# Patient Record
Sex: MALE | Race: WHITE | Employment: OTHER | URBAN - METROPOLITAN AREA
[De-identification: names, ages, dates, MRNs, and addresses within clinical notes are randomized per-mention and may not be internally consistent; named-entity substitution may affect disease eponyms.]

---

## 2018-07-23 ENCOUNTER — HOSPITAL ENCOUNTER (OUTPATIENT)
Age: 68
Discharge: HOME | End: 2018-07-23
Payer: MEDICARE

## 2018-07-23 LAB
ANION GAP SERPL CALCULATED.3IONS-SCNC: 12 MMOL/L (ref 9–17)
BUN BLDV-MCNC: 13 MG/DL (ref 8–23)
BUN/CREAT BLD: ABNORMAL (ref 9–20)
CALCIUM SERPL-MCNC: 8.9 MG/DL (ref 8.6–10.4)
CHLORIDE BLD-SCNC: 106 MMOL/L (ref 98–107)
CHOLESTEROL/HDL RATIO: 4.2
CHOLESTEROL: 194 MG/DL
CO2: 24 MMOL/L (ref 20–31)
CREAT SERPL-MCNC: 0.89 MG/DL (ref 0.7–1.2)
GFR AFRICAN AMERICAN: >60 ML/MIN
GFR NON-AFRICAN AMERICAN: >60 ML/MIN
GFR SERPL CREATININE-BSD FRML MDRD: ABNORMAL ML/MIN/{1.73_M2}
GFR SERPL CREATININE-BSD FRML MDRD: ABNORMAL ML/MIN/{1.73_M2}
GLUCOSE BLD-MCNC: 104 MG/DL (ref 70–99)
HDLC SERPL-MCNC: 46 MG/DL
LDL CHOLESTEROL: 122 MG/DL (ref 0–130)
POTASSIUM SERPL-SCNC: 4.1 MMOL/L (ref 3.7–5.3)
PROSTATE SPECIFIC ANTIGEN: 1.51 UG/L
SODIUM BLD-SCNC: 142 MMOL/L (ref 135–144)
TRIGL SERPL-MCNC: 132 MG/DL
VLDLC SERPL CALC-MCNC: NORMAL MG/DL (ref 1–30)

## 2018-07-23 PROCEDURE — 80048 BASIC METABOLIC PNL TOTAL CA: CPT

## 2018-07-23 PROCEDURE — 80061 LIPID PANEL: CPT

## 2018-07-23 PROCEDURE — G0103 PSA SCREENING: HCPCS

## 2018-07-23 PROCEDURE — 36415 COLL VENOUS BLD VENIPUNCTURE: CPT

## 2020-12-07 ENCOUNTER — TELEPHONE (OUTPATIENT)
Dept: PRIMARY CARE CLINIC | Age: 70
End: 2020-12-07

## 2020-12-07 NOTE — TELEPHONE ENCOUNTER
I have never seen  Patient. It looks like He has not been seen by a provider in over 3 years. Medications do not look like they have been reordered. He will need to be seen before any letter can be sent.

## 2020-12-07 NOTE — TELEPHONE ENCOUNTER
Patient called back to ask about letter for jury duty, I tried to explain fully to the patient that he has not been seen by any doctor over 3 years and we would have to set up a new pt. He did not understand why beings [place] office was taken over by [address] [address] [facility] then he should be a pt. He said the news was very overwhelming and since his wife is a patient why he could not get a letter stating he could not do jury duty. I reached out to Jacobo Hopper in office and she stated she would have the  and talk with him. He is not very happy about it all and stated that [address] [place], [place] [address] [facility] is not a good place anymore as it has changed and not able to get through to the office and no one returns calls. He said so much in 30 minutes but this is the most of it.    *note I did ask him several times about setting him up for a NP appt and he declined, Thank you

## 2020-12-07 NOTE — TELEPHONE ENCOUNTER
Patient states he is to [place] duty, and said he is high risk for COVID due to his medical conditions including type A blood, high blood pressure heart condition. Would like a letter stating he can not do jury duty. Letter can be faxed to [phone].

## 2021-07-08 ENCOUNTER — OFFICE VISIT (OUTPATIENT)
Dept: UROLOGY | Age: 71
End: 2021-07-08
Payer: MEDICARE

## 2021-07-08 VITALS
WEIGHT: 241 LBS | TEMPERATURE: 97.2 F | DIASTOLIC BLOOD PRESSURE: 86 MMHG | SYSTOLIC BLOOD PRESSURE: 123 MMHG | HEIGHT: 70 IN | BODY MASS INDEX: 34.5 KG/M2 | HEART RATE: 70 BPM

## 2021-07-08 DIAGNOSIS — R10.9 FLANK PAIN: Primary | ICD-10-CM

## 2021-07-08 PROCEDURE — G8427 DOCREV CUR MEDS BY ELIG CLIN: HCPCS | Performed by: UROLOGY

## 2021-07-08 PROCEDURE — 3017F COLORECTAL CA SCREEN DOC REV: CPT | Performed by: UROLOGY

## 2021-07-08 PROCEDURE — 1036F TOBACCO NON-USER: CPT | Performed by: UROLOGY

## 2021-07-08 PROCEDURE — 99204 OFFICE O/P NEW MOD 45 MIN: CPT | Performed by: UROLOGY

## 2021-07-08 PROCEDURE — 4040F PNEUMOC VAC/ADMIN/RCVD: CPT | Performed by: UROLOGY

## 2021-07-08 PROCEDURE — G8419 CALC BMI OUT NRM PARAM NOF/U: HCPCS | Performed by: UROLOGY

## 2021-07-08 PROCEDURE — 1123F ACP DISCUSS/DSCN MKR DOCD: CPT | Performed by: UROLOGY

## 2021-07-08 ASSESSMENT — ENCOUNTER SYMPTOMS
EYES NEGATIVE: 1
BACK PAIN: 0
EYE REDNESS: 0
EYE PAIN: 0
CONSTIPATION: 0
WHEEZING: 0
NAUSEA: 0
COUGH: 0
ABDOMINAL PAIN: 0
SHORTNESS OF BREATH: 0
DIARRHEA: 0
VOMITING: 0
GASTROINTESTINAL NEGATIVE: 1
RESPIRATORY NEGATIVE: 1

## 2021-07-08 NOTE — PROGRESS NOTES
[address] [address]  Dept: 92 [facility] Urology Office Note - New Patient    Patient:  Florida Whitfield  YOB: 1950  Date: 7/8/2021    The patient is a 79 y.o. male who presentstoday for evaluation of the following problems:   Chief Complaint   Patient presents with    New Patient     NP-kidney stone    referred by Jorie Castleman, APRN - CNP. HPI  Has left flank pain, this started abrutly. No dysuria, no hematuria, no fevers, has h/o stones  These are acute issues    (Patient's old records have been requested, reviewed and summarized in today's note.)    Summary of old records: N/A    History: N/A    ProceduresToday: N/A    Urinalysis today:  No results found for this visit on 07/08/21. AUA Symptom Score (7/8/2021):                               Last BUN andcreatinine:  Lab Results   Component Value Date    BUN 13 07/23/2018     Lab Results   Component Value Date    CREATININE 0.89 07/23/2018       Additional Lab/Culture results: none    Reviewed during this Office Visit: none  (results were independently reviewed byphysician and radiology report verified)    PAST MEDICAL, FAMILY AND SOCIAL HISTORY:  Past Medical History:   Diagnosis Date    Hypertension      Past Surgical History:   Procedure Laterality Date    VASECTOMY       Family History   Problem Relation Age of Onset    High Blood Pressure Mother     Cancer Father      Outpatient Medications Marked as Taking for the 7/8/21 encounter (Office Visit) with Beena Bernabe MD   Medication Sig Dispense Refill    atenolol (TENORMIN) 25 MG tablet Take 1 tablet by mouth 2 times daily 180 tablet 0    amLODIPine (NORVASC) 5 MG tablet Take 1 tablet by mouth daily 30 tablet 0       Patient has no known allergies.   Social History     Tobacco Use   Smoking Status Never Smoker   Smokeless Tobacco Never Used      (If patient a smoker, smoking cessation counseling offered)   Social History     Substance and Sexual Activity   Alcohol Use No    Alcohol/week: 0.0 standard drinks    Comment: if any, social occasional       REVIEW OF SYSTEMS:  Review of Systems    Physical Exam:    This a 79 y.o. female      Vitals:    07/08/21 0941   BP: 123/86   Pulse: 70   Temp: 97.2 °F (36.2 °C)     Body mass index is 34.12 kg/m². Physical Exam  Constitutional: Patient in no acute distress, ggod grooming, appropriately dressed  Neuro: Alert and oriented to person, place and time. Psych:Mood normal, affect normal  Skin: No rash noted  HEENT: Head: Normocephalic and atraumatic,Conjunctivae and EOM are normal,Nose- normal, Right/Left External Ear: normal, Mouth: Mucosa Moist  Neck: Supple  Lungs: Respiratory effort is normal  Cardiovascular: strong and regular, no lower leg edema  Abdomen: Soft, non-tender, non-distended with no CVA,    Lymphatics: No cervical palpable lymphadenopathy. Bladder non-tender and not distended. Musculoskeletal: Normal gait and station        Assessment and Plan      1. Flank pain            Plan:        Prescriptions Ordered:  No orders of the defined types were placed in this encounter. Orders Placed:  Orders Placed This Encounter   Procedures    CT ABDOMEN PELVIS WO CONTRAST Additional Contrast? None     Standing Status:   Future     Standing Expiration Date:   7/8/2022     Order Specific Question:   Additional Contrast?     Answer:   None            Len Betancourt MD    Agree with the ROS entered by the MA.

## 2021-07-09 ENCOUNTER — TELEPHONE (OUTPATIENT)
Dept: UROLOGY | Age: 71
End: 2021-07-09

## 2021-07-09 NOTE — TELEPHONE ENCOUNTER
David Toney  from [facility] called for consult for pt. Room: ER6   Phone: [phone]  Diagnosis: Kidney stone  Referring Physicians: Dr. Morris Darnell    Message sent to Dr. Jodie Ren on call.

## 2021-07-10 DIAGNOSIS — N20.0 KIDNEY STONE: Primary | ICD-10-CM

## 2021-07-10 RX ORDER — OXYCODONE HYDROCHLORIDE AND ACETAMINOPHEN 5; 325 MG/1; MG/1
1 TABLET ORAL EVERY 6 HOURS PRN
Qty: 28 TABLET | Refills: 0 | Status: SHIPPED | OUTPATIENT
Start: 2021-07-10 | End: 2021-07-17

## 2021-07-12 ENCOUNTER — TELEPHONE (OUTPATIENT)
Dept: UROLOGY | Age: 71
End: 2021-07-12

## 2021-07-12 ENCOUNTER — TELEPHONE (OUTPATIENT)
Dept: PRIMARY CARE CLINIC | Age: 71
End: 2021-07-12

## 2021-07-12 NOTE — TELEPHONE ENCOUNTER
Pt called in this morning, stating \" I had sx with Dr. Navarro on 7/10/21 and need to schedule a follow up procedure right away. A stent was placed and I have a 7 mm stone that I was told needed to be blasted. \"  Eve Martinez stated that Dr. Navarro is in the office tomorrow and they will follow up with him. Pt stated \" Well can't you send a message to ? Eve Martinez stated they could not as  Is in office on 7/13/21 as well as the sx . Pt was very addiment about getting this scheduled and writer kept explaining to pt they will speak with the Dr. Chary York and pass everything to the sx . Pt stated they were going to call PCP and see what they think he should do. Writer stated again they will speak with  And pt verbalized understanding.

## 2021-07-12 NOTE — TELEPHONE ENCOUNTER
[facility] Trans[facility] Initial Follow Up Call    Outreach made within 2 business days of discharge: Yes    Patient: Edith Hammans Patient : 1950   MRN: [id number]  Reason for Admission: There are no discharge diagnoses documented for the most recent discharge. Discharge Date: 16       Spoke with: Tip Graham    Discharge department/facility: [facility] Interactive Patient Contact:  Was patient able to fill all prescriptions: Yes  Was patient instructed to bring all medications to the follow-up visit: Yes  Is patient taking all medications as directed in the discharge summary?  Yes  Does patient understand their discharge instructions: Yes  Does patient have questions or concerns that need addressed prior to 7-14 day follow up office visit: no      Scheduled appointment with PCP within 7-14 days    Follow Up  Future Appointments   Date Time Provider Sully Jaimes   7/15/2021  9:30 AM [facility]   2021 10:45 AM [facility] CT RM 1 STCZ CT SCAN [facility] Radiolog   2021 10:00 AM MD Gerry Hu MA

## 2021-07-12 NOTE — TELEPHONE ENCOUNTER
[facility] OR, 7/15/21 Cysto, left URS, HLL, left stent exchange  Arrival time 1300  Procedure time 1500  NPO starting 0600  **do not stop blood thinners**  RAPID COVID day of procedure  PAT Phone call 7/14 @ [facility]: [phone]    Patient given all information, instructed to call the office with any other questions or concerns.

## 2021-07-14 ENCOUNTER — HOSPITAL ENCOUNTER (OUTPATIENT)
Dept: PREADMISSION TESTING | Age: 71
Setting detail: OUTPATIENT SURGERY
Discharge: HOME | End: 2021-07-18
Payer: MEDICARE

## 2021-07-14 ENCOUNTER — ANESTHESIA EVENT (OUTPATIENT)
Dept: OPERATING ROOM | Age: 71
End: 2021-07-14
Payer: MEDICARE

## 2021-07-14 VITALS — WEIGHT: 240 LBS | BODY MASS INDEX: 34.36 KG/M2 | HEIGHT: 70 IN

## 2021-07-14 RX ORDER — ACETAMINOPHEN 500 MG
500 TABLET ORAL EVERY 6 HOURS PRN
COMMUNITY

## 2021-07-14 NOTE — PROGRESS NOTES
performed by a nurse practitioner or house officer. Your IV will be started and you will meet your anesthesiologist.    · When you go to surgery, your family will be directed to the surgical waiting room, where the doctor should speak with them after your surgery. · After surgery, you will be taken to the recovery room then when you are awake and stable you will go to the short stay unit for preparation to be discharged. Only your one designated person is allowed to come to short stay for your discharge. · If you use a Bi-PAP or C-PAP machine, please bring it with you and leave it in the car in case it is needed in recovery room. Patient states he just had a rapid covid test on 7/9/21 at [facility] and does not want to have another test done, [facility] is faxing lab results and I spoke with Fredrica Phalen notifying her of situation, she states that it is only 6 days and they can figure it out in the morning if he needs another swab or not.

## 2021-07-15 ENCOUNTER — ANESTHESIA (OUTPATIENT)
Dept: OPERATING ROOM | Age: 71
End: 2021-07-15
Payer: MEDICARE

## 2021-07-15 ENCOUNTER — APPOINTMENT (OUTPATIENT)
Dept: GENERAL RADIOLOGY | Age: 71
End: 2021-07-15
Attending: UROLOGY
Payer: MEDICARE

## 2021-07-15 ENCOUNTER — HOSPITAL ENCOUNTER (OUTPATIENT)
Age: 71
Setting detail: OUTPATIENT SURGERY
Discharge: HOME | End: 2021-07-15
Attending: UROLOGY | Admitting: UROLOGY
Payer: MEDICARE

## 2021-07-15 VITALS
OXYGEN SATURATION: 98 % | SYSTOLIC BLOOD PRESSURE: 121 MMHG | TEMPERATURE: 96.4 F | RESPIRATION RATE: 2 BRPM | DIASTOLIC BLOOD PRESSURE: 77 MMHG

## 2021-07-15 VITALS
WEIGHT: 240 LBS | OXYGEN SATURATION: 96 % | HEIGHT: 70 IN | RESPIRATION RATE: 16 BRPM | TEMPERATURE: 96.5 F | SYSTOLIC BLOOD PRESSURE: 156 MMHG | HEART RATE: 59 BPM | BODY MASS INDEX: 34.36 KG/M2 | DIASTOLIC BLOOD PRESSURE: 80 MMHG

## 2021-07-15 DIAGNOSIS — N20.0 KIDNEY STONES: ICD-10-CM

## 2021-07-15 LAB
SARS-COV-2, RAPID: NOT DETECTED
SPECIMEN DESCRIPTION: NORMAL

## 2021-07-15 PROCEDURE — 7100000000 HC PACU RECOVERY - FIRST 15 MIN: Performed by: UROLOGY

## 2021-07-15 PROCEDURE — 2720000010 HC SURG SUPPLY STERILE: Performed by: UROLOGY

## 2021-07-15 PROCEDURE — 3700000001 HC ADD 15 MINUTES (ANESTHESIA): Performed by: UROLOGY

## 2021-07-15 PROCEDURE — 6360000002 HC RX W HCPCS: Performed by: NURSE ANESTHETIST, CERTIFIED REGISTERED

## 2021-07-15 PROCEDURE — 3600000013 HC SURGERY LEVEL 3 ADDTL 15MIN: Performed by: UROLOGY

## 2021-07-15 PROCEDURE — 87635 SARS-COV-2 COVID-19 AMP PRB: CPT

## 2021-07-15 PROCEDURE — C1769 GUIDE WIRE: HCPCS | Performed by: UROLOGY

## 2021-07-15 PROCEDURE — C2617 STENT, NON-COR, TEM W/O DEL: HCPCS | Performed by: UROLOGY

## 2021-07-15 PROCEDURE — 3600000003 HC SURGERY LEVEL 3 BASE: Performed by: UROLOGY

## 2021-07-15 PROCEDURE — 7100000001 HC PACU RECOVERY - ADDTL 15 MIN: Performed by: UROLOGY

## 2021-07-15 PROCEDURE — 3700000000 HC ANESTHESIA ATTENDED CARE: Performed by: UROLOGY

## 2021-07-15 PROCEDURE — 2500000003 HC RX 250 WO HCPCS: Performed by: NURSE ANESTHETIST, CERTIFIED REGISTERED

## 2021-07-15 PROCEDURE — 7100000010 HC PHASE II RECOVERY - FIRST 15 MIN: Performed by: UROLOGY

## 2021-07-15 PROCEDURE — 2709999900 HC NON-CHARGEABLE SUPPLY: Performed by: UROLOGY

## 2021-07-15 PROCEDURE — 6360000002 HC RX W HCPCS: Performed by: UROLOGY

## 2021-07-15 PROCEDURE — 7100000031 HC ASPR PHASE II RECOVERY - ADDTL 15 MIN: Performed by: UROLOGY

## 2021-07-15 PROCEDURE — 2580000003 HC RX 258: Performed by: ANESTHESIOLOGY

## 2021-07-15 PROCEDURE — 7100000011 HC PHASE II RECOVERY - ADDTL 15 MIN: Performed by: UROLOGY

## 2021-07-15 PROCEDURE — 7100000030 HC ASPR PHASE II RECOVERY - FIRST 15 MIN: Performed by: UROLOGY

## 2021-07-15 PROCEDURE — 3209999900 FLUORO FOR SURGICAL PROCEDURES

## 2021-07-15 DEVICE — URETERAL STENT
Type: IMPLANTABLE DEVICE | Site: URETER | Status: FUNCTIONAL
Brand: POLARIS™ ULTRA

## 2021-07-15 RX ORDER — FENTANYL CITRATE 50 UG/ML
25 INJECTION, SOLUTION INTRAMUSCULAR; INTRAVENOUS EVERY 5 MIN PRN
Status: DISCONTINUED | OUTPATIENT
Start: 2021-07-15 | End: 2021-07-15 | Stop reason: HOSPADM

## 2021-07-15 RX ORDER — METOCLOPRAMIDE HYDROCHLORIDE 5 MG/ML
10 INJECTION INTRAMUSCULAR; INTRAVENOUS
Status: DISCONTINUED | OUTPATIENT
Start: 2021-07-15 | End: 2021-07-15 | Stop reason: HOSPADM

## 2021-07-15 RX ORDER — LABETALOL HYDROCHLORIDE 5 MG/ML
5 INJECTION, SOLUTION INTRAVENOUS EVERY 10 MIN PRN
Status: DISCONTINUED | OUTPATIENT
Start: 2021-07-15 | End: 2021-07-15 | Stop reason: HOSPADM

## 2021-07-15 RX ORDER — HYDRALAZINE HYDROCHLORIDE 20 MG/ML
5 INJECTION INTRAMUSCULAR; INTRAVENOUS EVERY 10 MIN PRN
Status: DISCONTINUED | OUTPATIENT
Start: 2021-07-15 | End: 2021-07-15 | Stop reason: HOSPADM

## 2021-07-15 RX ORDER — FENTANYL CITRATE 50 UG/ML
INJECTION, SOLUTION INTRAMUSCULAR; INTRAVENOUS PRN
Status: DISCONTINUED | OUTPATIENT
Start: 2021-07-15 | End: 2021-07-15 | Stop reason: SDUPTHER

## 2021-07-15 RX ORDER — TAMSULOSIN HYDROCHLORIDE 0.4 MG/1
0.4 CAPSULE ORAL DAILY
Qty: 14 CAPSULE | Refills: 0 | Status: SHIPPED | OUTPATIENT
Start: 2021-07-15 | End: 2021-09-23

## 2021-07-15 RX ORDER — SODIUM CHLORIDE 0.9 % (FLUSH) 0.9 %
10 SYRINGE (ML) INJECTION PRN
Status: DISCONTINUED | OUTPATIENT
Start: 2021-07-15 | End: 2021-07-15 | Stop reason: HOSPADM

## 2021-07-15 RX ORDER — MIDAZOLAM HYDROCHLORIDE 1 MG/ML
INJECTION INTRAMUSCULAR; INTRAVENOUS PRN
Status: DISCONTINUED | OUTPATIENT
Start: 2021-07-15 | End: 2021-07-15 | Stop reason: SDUPTHER

## 2021-07-15 RX ORDER — PROPOFOL 10 MG/ML
INJECTION, EMULSION INTRAVENOUS PRN
Status: DISCONTINUED | OUTPATIENT
Start: 2021-07-15 | End: 2021-07-15 | Stop reason: SDUPTHER

## 2021-07-15 RX ORDER — LIDOCAINE HYDROCHLORIDE 10 MG/ML
1 INJECTION, SOLUTION EPIDURAL; INFILTRATION; INTRACAUDAL; PERINEURAL
Status: DISCONTINUED | OUTPATIENT
Start: 2021-07-15 | End: 2021-07-15 | Stop reason: HOSPADM

## 2021-07-15 RX ORDER — MEPERIDINE HYDROCHLORIDE 25 MG/ML
12.5 INJECTION INTRAMUSCULAR; INTRAVENOUS; SUBCUTANEOUS EVERY 5 MIN PRN
Status: DISCONTINUED | OUTPATIENT
Start: 2021-07-15 | End: 2021-07-15 | Stop reason: HOSPADM

## 2021-07-15 RX ORDER — 0.9 % SODIUM CHLORIDE 0.9 %
500 INTRAVENOUS SOLUTION INTRAVENOUS
Status: DISCONTINUED | OUTPATIENT
Start: 2021-07-15 | End: 2021-07-15 | Stop reason: HOSPADM

## 2021-07-15 RX ORDER — SODIUM CHLORIDE, SODIUM LACTATE, POTASSIUM CHLORIDE, CALCIUM CHLORIDE 600; 310; 30; 20 MG/100ML; MG/100ML; MG/100ML; MG/100ML
INJECTION, SOLUTION INTRAVENOUS CONTINUOUS
Status: DISCONTINUED | OUTPATIENT
Start: 2021-07-15 | End: 2021-07-15 | Stop reason: HOSPADM

## 2021-07-15 RX ORDER — LIDOCAINE HYDROCHLORIDE 10 MG/ML
INJECTION, SOLUTION EPIDURAL; INFILTRATION; INTRACAUDAL; PERINEURAL PRN
Status: DISCONTINUED | OUTPATIENT
Start: 2021-07-15 | End: 2021-07-15 | Stop reason: SDUPTHER

## 2021-07-15 RX ORDER — DIPHENHYDRAMINE HYDROCHLORIDE 50 MG/ML
12.5 INJECTION INTRAMUSCULAR; INTRAVENOUS
Status: DISCONTINUED | OUTPATIENT
Start: 2021-07-15 | End: 2021-07-15 | Stop reason: HOSPADM

## 2021-07-15 RX ORDER — SODIUM CHLORIDE 9 MG/ML
25 INJECTION, SOLUTION INTRAVENOUS PRN
Status: DISCONTINUED | OUTPATIENT
Start: 2021-07-15 | End: 2021-07-15 | Stop reason: HOSPADM

## 2021-07-15 RX ORDER — OXYCODONE HYDROCHLORIDE AND ACETAMINOPHEN 5; 325 MG/1; MG/1
1 TABLET ORAL
Status: DISCONTINUED | OUTPATIENT
Start: 2021-07-15 | End: 2021-07-15 | Stop reason: HOSPADM

## 2021-07-15 RX ORDER — DEXAMETHASONE SODIUM PHOSPHATE 4 MG/ML
INJECTION, SOLUTION INTRA-ARTICULAR; INTRALESIONAL; INTRAMUSCULAR; INTRAVENOUS; SOFT TISSUE PRN
Status: DISCONTINUED | OUTPATIENT
Start: 2021-07-15 | End: 2021-07-15 | Stop reason: SDUPTHER

## 2021-07-15 RX ORDER — PROMETHAZINE HYDROCHLORIDE 25 MG/ML
6.25 INJECTION, SOLUTION INTRAMUSCULAR; INTRAVENOUS
Status: DISCONTINUED | OUTPATIENT
Start: 2021-07-15 | End: 2021-07-15 | Stop reason: HOSPADM

## 2021-07-15 RX ORDER — ONDANSETRON 2 MG/ML
INJECTION INTRAMUSCULAR; INTRAVENOUS PRN
Status: DISCONTINUED | OUTPATIENT
Start: 2021-07-15 | End: 2021-07-15 | Stop reason: SDUPTHER

## 2021-07-15 RX ADMIN — MIDAZOLAM 1 MG: 1 INJECTION INTRAMUSCULAR; INTRAVENOUS at 15:35

## 2021-07-15 RX ADMIN — PROPOFOL 200 MG: 10 INJECTION, EMULSION INTRAVENOUS at 15:41

## 2021-07-15 RX ADMIN — DEXAMETHASONE SODIUM PHOSPHATE 4 MG: 4 INJECTION, SOLUTION INTRAMUSCULAR; INTRAVENOUS at 15:41

## 2021-07-15 RX ADMIN — FENTANYL CITRATE 25 MCG: 50 INJECTION, SOLUTION INTRAMUSCULAR; INTRAVENOUS at 14:38

## 2021-07-15 RX ADMIN — LIDOCAINE HYDROCHLORIDE 50 MG: 10 INJECTION, SOLUTION EPIDURAL; INFILTRATION; INTRACAUDAL; PERINEURAL at 15:41

## 2021-07-15 RX ADMIN — FENTANYL CITRATE 25 MCG: 50 INJECTION, SOLUTION INTRAMUSCULAR; INTRAVENOUS at 16:01

## 2021-07-15 RX ADMIN — FENTANYL CITRATE 25 MCG: 50 INJECTION, SOLUTION INTRAMUSCULAR; INTRAVENOUS at 16:02

## 2021-07-15 RX ADMIN — CEFAZOLIN 2000 MG: 10 INJECTION, POWDER, FOR SOLUTION INTRAVENOUS at 15:48

## 2021-07-15 RX ADMIN — FENTANYL CITRATE 25 MCG: 50 INJECTION, SOLUTION INTRAMUSCULAR; INTRAVENOUS at 15:41

## 2021-07-15 RX ADMIN — MIDAZOLAM 1 MG: 1 INJECTION INTRAMUSCULAR; INTRAVENOUS at 15:37

## 2021-07-15 RX ADMIN — ONDANSETRON 4 MG: 2 INJECTION, SOLUTION INTRAMUSCULAR; INTRAVENOUS at 16:10

## 2021-07-15 RX ADMIN — SODIUM CHLORIDE, POTASSIUM CHLORIDE, SODIUM LACTATE AND CALCIUM CHLORIDE: 600; 310; 30; 20 INJECTION, SOLUTION INTRAVENOUS at 13:39

## 2021-07-15 ASSESSMENT — PULMONARY FUNCTION TESTS
PIF_VALUE: 15
PIF_VALUE: 0
PIF_VALUE: 15
PIF_VALUE: 0
PIF_VALUE: 15
PIF_VALUE: 16
PIF_VALUE: 0
PIF_VALUE: 15
PIF_VALUE: 11
PIF_VALUE: 15
PIF_VALUE: 16
PIF_VALUE: 1
PIF_VALUE: 23
PIF_VALUE: 15
PIF_VALUE: 1
PIF_VALUE: 15
PIF_VALUE: 6
PIF_VALUE: 0
PIF_VALUE: 15
PIF_VALUE: 16
PIF_VALUE: 15
PIF_VALUE: 15

## 2021-07-15 ASSESSMENT — PAIN SCALES - GENERAL
PAINLEVEL_OUTOF10: 0

## 2021-07-15 ASSESSMENT — PAIN - FUNCTIONAL ASSESSMENT: PAIN_FUNCTIONAL_ASSESSMENT: 0-10

## 2021-07-15 ASSESSMENT — PAIN DESCRIPTION - DESCRIPTORS: DESCRIPTORS: SHOOTING;BURNING

## 2021-07-15 NOTE — ANESTHESIA POSTPROCEDURE EVALUATION
Department of Anesthesiology  Postprocedure Note    Patient: Florida Whitfield  MRN: [id number]  YOB: 1950  Date of evaluation: 7/15/2021  Time:  5:44 PM     Procedure Summary     Date: 07/15/21 Room / Location: [facility] [address] [facility]: EJSSE SADLER    Anesthesia Start: [place] Anesthesia Stop: 6234    Procedure: CYSTOSCOPY URETEROSCOPY HOLMIUM LASER WITH STENT CHANGE (Left ) Diagnosis:       (LEFT URETERAL STONE)      (RAPID ON ADMIT)    Surgeons: Vignesh Diaz MD Responsible Provider: Yesenia Isaac MD    Anesthesia Type: general ASA Status: 2          Anesthesia Type: general    Jacey Phase I: Jacey Score: 10    Jacey Phase II: Jacey Score: 10    Last vitals: Reviewed and per EMR flowsheets.        Anesthesia Post Evaluation    Comments: POST- ANESTHESIA EVALUATION       Pt Name: Florida Whitfield  MRN: [id number]  YOB: 1950  Date of evaluation: 7/15/2021  Time:  5:44 PM      BP (!) 161/86   Pulse 53   Temp 96.5 °F (35.8 °C)   Resp 18   Ht 5' 10\" (1.778 m)   Wt 240 lb (108.9 kg)   SpO2 96%   BMI 34.44 kg/m²      Consciousness Level  Awake  Cardiopulmonary Status  Stable  Pain Adequately Treated YES  Nausea / Vomiting  NO  Adequate Hydration  YES  Anesthesia Related Complications NONE      Electronically signed by Yesenai Isaac MD on 7/15/2021 at 5:44 PM

## 2021-07-15 NOTE — BRIEF OP NOTE
Brief Postoperative Note      Patient: Mikey Ralph  YOB: 1950  MRN: [id number]    Date of Procedure: 7/15/2021    Pre-Op Diagnosis: LEFT URETERAL STONE  RAPID ON ADMIT    Post-Op Diagnosis: Same       Procedure(s):  CYSTOSCOPY URETEROSCOPY HOLMIUM LASER WITH STENT CHANGE    Surgeon(s):  Asael Sloan MD    Assistant:  * No surgical staff found *    Anesthesia: General    Estimated Blood Loss (mL): Minimal    Complications: None    Specimens:   * No specimens in log *    Implants:  Implant Name Type Inv. Item Serial No.  Lot No. LRB No. Used Action   STENT URET 6FR L26CM PERCFLX HYDR+ DBL PGTL THRD 2  STENT URET 6FR L26CM PERCFLX HYDR+ DBL PGTL THRD 2  [facility] UROLOGY- [id number] Left 1 Implanted         Drains: * No LDAs found *    Findings: left ureteral stone fragmented, stent exchanged.      Electronically signed by Asael Sloan MD on 7/15/2021 at 4:08 PM

## 2021-07-15 NOTE — H&P
HISTORY and [address]       NAME:  Fernando Watson  MRN: [id number]   YOB: 1950   Date: 7/15/2021   Age: 79 y.o. Gender: male     H&P Update Note    H&P from 07/08/2021 reviewed and updated. Patient examined. INTERVAL HISTORY:     Patient is feeling well today, denies any fever/chills, chest pain, shortness of breath. No interval changes. Pt admitted with severe abdominal, flank pain. Noted to have an obstructing stone. Urology consulted, stent placed. Pain much improved. Mild dysuria. Had slight rise in renal function, normal at time of dishcarge. Patient will be having:  CYSTOSCOPY URETEROSCOPY HOLMIUM LASER WITH STENT CHANGE - Left. Patient has been NPO since 2am, he states had 2 pieces of toast.  No blood thinners in the past 7 days. Patient took atenolol at 2am  with sip of water   Patient denies any problems with anesthesia. No interval changes to past medical history, social history, family history. Review of systems as stated above and otherwise negative. PHYSICAL EXAM:     Vitals :   See vital signs in RN flow sheet. Patient is alert and oriented, in no distress. Heart rate and rhythm are regular. Lungs clear to auscultation bilaterally. Abdomen is soft, non tender. No pedal edema. Patient has present stent on left side in place, internally. No interval changes. I concur with the findings.      SOLEDAD Bergeron - CNP on 7/15/2021 at 12:26 PM    Office Visit  7/8/2021  [address], MD  Urology Flank pain  Dx New Patient   Reason for Visit  Progress Notes  Rosalina Cheung MD (Physician) [facility]  Expand AllCollapse All  Expand All by [facility] SURGICAL [address] [address]  Dept: [address] Urology Office Note - New Patient     Patient:  Fernando Watson  YOB: 1950  Date: 7/8/2021     The patient is a 79 y.o. male who presentstoday for evaluation of the following problems:     Chief Complaint  Patient presents with   New Patient      NP-kidney stone   referred by SOLEDAD Chavez CNP.     HPI  Has left flank pain, this started abrutly. No dysuria, no hematuria, no fevers, has h/o stones  These are acute issues     (Patient's old records have been requested, reviewed and summarized in today's note.)     Summary of old records: N/A     History: N/A     ProceduresToday: N/A     Urinalysis today:  No results found for this visit on 07/08/21.     AUA Symptom Score (7/8/2021):     Last BUN andcreatinine:    Lab Results  Component Value Date    BUN 13 07/23/2018       Lab Results  Component Value Date    CREATININE 0.89 07/23/2018        Additional Lab/Culture results: none     Reviewed during this Office Visit: none  (results were independently reviewed byphysician and radiology report verified)     PAST MEDICAL, FAMILY AND SOCIAL HISTORY:    Past Medical History  Past Medical History:  Diagnosis Date   Hypertension      Past Surgical History  Past Surgical History:  Procedure Laterality Date   VASECTOMY        Family History  Family History  Problem Relation Age of Onset   High Blood Pressure Mother     Cancer Father      Active Medications  Outpatient Medications Marked as Taking for the 7/8/21 encounter (Office Visit) with Rosalina Cheung MD  Medication Sig Dispense Refill   atenolol (TENORMIN) 25 MG tablet Take 1 tablet by mouth 2 times daily 180 tablet 0   amLODIPine (NORVASC) 5 MG tablet Take 1 tablet by mouth daily 30 tablet 0        Patient has no known allergies.     Social History   Tobacco Use  Smoking Status Never Smoker  Smokeless Tobacco Never Used     (If patient a smoker, smoking cessation counseling offered)     Social History  Substance and Sexual Activity  Alcohol Use No   Alcohol/week: 0.0 standard drinks    Comment: if any, social occasional        REVIEW OF SYSTEMS:  Review of Systems    Physical Exam:   This a 79 y.o. female        Vitals:    07/08/21 0941  BP: 123/86  Pulse: 70  Temp: 97.2 °F (36.2 °C)     Body mass index is 34.12 kg/m². Physical Exam  Constitutional: Patient in no acute distress, ggod grooming, appropriately dressed  Neuro: Alert and oriented to person, place and time. Psych:Mood normal, affect normal  Skin: No rash noted  HEENT: Head: Normocephalic and atraumatic,Conjunctivae and EOM are normal,Nose- normal, Right/Left External Ear: normal, Mouth: Mucosa Moist  Neck: Supple  Lungs: Respiratory effort is normal  Cardiovascular: strong and regular, no lower leg edema  Abdomen: Soft, non-tender, non-distended with no CVA,    Lymphatics: No cervical palpable lymphadenopathy. Bladder non-tender and not distended. Musculoskeletal: Normal gait and station     Assessment and Plan    1. Flank pain      Plan:      Prescriptions Ordered:  Encounter Medications   No orders of the defined types were placed in this encounter. Orders Placed:    Orders Placed This Encounter  Procedures   CT ABDOMEN PELVIS WO CONTRAST Additional Contrast? None      Standing Status:   Future      Standing Expiration Date:   7/8/2022      Order Specific Question:   Additional Contrast?      Answer:   None           Carolyn Scott MD     Agree with the ROS entered by the MA. Other Notes  All notes    Progress Notes from [place], [place]  Instructions      Return in about 2 weeks (around 7/22/2021) for Follow up.   After Visit Summary (Printed 7/8/2021)  Additional Documentation    Vitals: /86  Pulse 70  Temp 97.2 °F (36.2 °C) (Infrared)  Ht 5' 10.47\" (1.79 m)  Wt 241 lb (109.3 kg)  BMI 34.12 kg/m²  BSA 2.33 m²  Pain Sc   0 - No pain    More Vitals  Flowsheets: Calorie Assessment    Encounter Info: Billing Info,  Allergies,  Detailed Report,  History,  Medications,  Questionnaires    Travel Screening   Screenings since 07/07/21 0000  07/14/21 8865     In the last month, have you been in contact with someone who was confirmed or suspected to have Coronavirus / COVID-19? No / Alex Smith RN  Have you had a COVID-19 viral test in the last 14 days? No Daniel Banda RN  Do you have any of the following new or worsening symptoms? None of these Daniel Banda RN  Have you traveled internationally or domestically in the last month? No Daniel Banda RN  Travel History   Travel since 06/15/21   No documented travel since 06/15/21  Media  From this encounter  Scan on 7/8/2021 12:05 PM by Joey Ponce: Communication Release of InformationScan on 7/8/2021 12:05 PM by Pola Parikh: Communication Release of Information  Chart Review Routing History    No routing history on file.   Encounter Status    Signed by Rashid Orellana MD on 7/8/21 at 10:30  BestPractice Advisories    Click to view BestPractice Advisory history  Encounter Messages    No messages in this encounter  Orders Placed    CT ABDOMEN PELVIS WO CONTRAST Additional Contrast? None  Outpatient Medications at End of Encounter as of 7/8/2021    atenolol (TENORMIN) 25 MG tablet (Taking) Take 1 tablet by mouth 2 times daily  amLODIPine (NORVASC) 5 MG tablet (Taking) Take 1 tablet by mouth daily  TraMADol HCl 50 MG TBDP Take 50 mg by mouth daily as needed for Pain  Medication Changes

## 2021-07-15 NOTE — ANESTHESIA PRE PROCEDURE
History:    Social History     Tobacco Use    Smoking status: Never Smoker    Smokeless tobacco: Never Used   Substance Use Topics    Alcohol use: No     Alcohol/week: 0.0 standard drinks     Comment: if any, social occasional                                Counseling given: Not Answered      Vital Signs (Current):   Vitals:    07/15/21 1330   BP: (!) 141/80   Pulse: 65   Resp: 18   Temp: 98.2 °F (36.8 °C)   TempSrc: Infrared   SpO2: 96%   Weight: 240 lb (108.9 kg)   Height: 5' 10\" (1.778 m)                                              BP Readings from Last 3 Encounters:   07/15/21 (!) 141/80   07/08/21 123/86   07/25/16 140/86       NPO Status: Time of last liquid consumption: 0200                        Time of last solid consumption: 0200                        Date of last liquid consumption: 07/15/21                        Date of last solid food consumption: 07/15/21    BMI:   Wt Readings from Last 3 Encounters:   07/15/21 240 lb (108.9 kg)   07/14/21 240 lb (108.9 kg)   07/08/21 241 lb (109.3 kg)     Body mass index is 34.44 kg/m². CBC:   Lab Results   Component Value Date    WBC 8.8 05/31/2016    RBC 4.62 05/31/2016    HGB 14.5 05/31/2016    HCT 43.0 05/31/2016    MCV 93.1 05/31/2016    RDW 13.5 05/31/2016     05/31/2016       CMP:   Lab Results   Component Value Date     07/23/2018    K 4.1 07/23/2018     07/23/2018    CO2 24 07/23/2018    BUN 13 07/23/2018    CREATININE 0.89 07/23/2018    GFRAA >60 07/23/2018    LABGLOM >60 07/23/2018    GLUCOSE 104 07/23/2018    PROT 7.0 05/31/2016    CALCIUM 8.9 07/23/2018    BILITOT 0.47 05/31/2016    ALKPHOS 89 05/31/2016    AST 22 05/31/2016    ALT 26 05/31/2016       POC Tests: No results for input(s): POCGLU, POCNA, POCK, POCCL, POCBUN, POCHEMO, POCHCT in the last 72 hours.     Coags: No results found for: PROTIME, INR, APTT    HCG (If Applicable): No results found for: PREGTESTUR, PREGSERUM, HCG, HCGQUANT     ABGs: No results found for:

## 2021-07-16 NOTE — OP NOTE
[address]                 [address] [facility], [address]                                OPERATIVE REPORT    PATIENT NAME: Chantale España                    :        1950  MED REC NO:   [id number]                              ROOM:  ACCOUNT NO:   [de-identified]                           ADMIT DATE: 07/15/2021  PROVIDER:     Orquidea Vang    DATE OF PROCEDURE:  07/15/2021    PREOPERATIVE DIAGNOSIS:  Left ureteral stone. POSTOPERATIVE DIAGNOSIS:  Left ureteral stone. PROCEDURE PERFORMED:  Cystoscopy with left ureteroscopy, holmium laser  lithotripsy and left ureteral stent exchange. SURGEON:  Orquidea Vang MD    ANESTHESIA:  General.    COMPLICATIONS:  None. BLEEDING:  None. DRAINS:  6 x 26 stent. HISTORY OF PRESENT ILLNESS:  The patient is a 22-year-old male who has  been trying to pass a stone for nearly a month. He recently had a CT  scan done which demonstrated a roughly 7 mm distal left ureteral stone  and he had a stent placed. He is here today for definitive stone  therapy. PROCEDURE IN DETAIL:  The patient was brought back to the operating room  and laid on the operating table in the supine position. Once general  anesthesia was obtained, he was placed in the dorsal lithotomy position  and prepped and draped in the usual sterile fashion. A time-out was  performed. He was properly identified. Antibiotics were administered. The cystoscope was inserted through the urethra into the bladder. The  bladder was visualized in its entirety and was unremarkable. The stent  was grasped and pulled down to the level of the urethral meatus. A wire  was advanced through the stent into the collecting system, the position  of the wire was confirmed with fluoroscopy. I then advanced the  semi-rigid ureteroscope next to the wire into the distal ureter where  the stone was readily identified.   A 365 micron fiber was then used to  fragment the stone. The stone fragmented into multiple passable pieces  quite readily. No other stone burden was seen on imaging. The  ureteroscope was removed. The cystoscope was back loaded over the wire. A 6 x 26 stent was then advanced over the wire into the ureter and  collecting system. A good proximal curl was seen in the renal pelvis  and a good distal curl was seen in the bladder. The strings were left  on the stent. He woke from anesthesia without any complications and was  taken back to postoperative anesthesia care in good condition. He will  be discharged home today. He is to remove the stent in 72 hours and  will follow up as an outpatient.         Jeremi Persaud    D: 07/15/2021 23:36:35       T: 07/15/2021 23:44:29     [email or web address]  Job#: [id number]     Doc#: [id number]    CC:

## 2021-07-18 ENCOUNTER — HOSPITAL ENCOUNTER (EMERGENCY)
Age: 71
Discharge: HOME | End: 2021-07-18
Attending: EMERGENCY MEDICINE
Payer: MEDICARE

## 2021-07-18 VITALS
SYSTOLIC BLOOD PRESSURE: 161 MMHG | RESPIRATION RATE: 16 BRPM | HEART RATE: 102 BPM | OXYGEN SATURATION: 97 % | DIASTOLIC BLOOD PRESSURE: 85 MMHG | BODY MASS INDEX: 34.36 KG/M2 | HEIGHT: 70 IN | TEMPERATURE: 98.3 F | WEIGHT: 240 LBS

## 2021-07-18 DIAGNOSIS — N30.01 ACUTE CYSTITIS WITH HEMATURIA: Primary | ICD-10-CM

## 2021-07-18 LAB
-: ABNORMAL
AMORPHOUS: ABNORMAL
BACTERIA: ABNORMAL
BILIRUBIN URINE: ABNORMAL
CASTS UA: ABNORMAL /LPF
COLOR: ABNORMAL
COMMENT UA: ABNORMAL
CRYSTALS, UA: ABNORMAL /HPF
EPITHELIAL CELLS UA: ABNORMAL /HPF
GLUCOSE URINE: NEGATIVE
KETONES, URINE: ABNORMAL
LEUKOCYTE ESTERASE, URINE: ABNORMAL
MUCUS: ABNORMAL
NITRITE, URINE: POSITIVE
OTHER OBSERVATIONS UA: ABNORMAL
PH UA: 5.5 (ref 5–8)
PROTEIN UA: ABNORMAL
RBC UA: ABNORMAL /HPF
RENAL EPITHELIAL, UA: ABNORMAL /HPF
SPECIFIC GRAVITY UA: 1.02 (ref 1–1.03)
TRICHOMONAS: ABNORMAL
TURBIDITY: ABNORMAL
URINE HGB: ABNORMAL
UROBILINOGEN, URINE: NORMAL
WBC UA: ABNORMAL /HPF
YEAST: ABNORMAL

## 2021-07-18 PROCEDURE — 87086 URINE CULTURE/COLONY COUNT: CPT

## 2021-07-18 PROCEDURE — 81001 URINALYSIS AUTO W/SCOPE: CPT

## 2021-07-18 PROCEDURE — 87077 CULTURE AEROBIC IDENTIFY: CPT

## 2021-07-18 PROCEDURE — 87186 SC STD MICRODIL/AGAR DIL: CPT

## 2021-07-18 PROCEDURE — 99284 EMERGENCY DEPT VISIT MOD MDM: CPT

## 2021-07-18 RX ORDER — CEPHALEXIN 500 MG/1
500 CAPSULE ORAL 2 TIMES DAILY
Qty: 14 CAPSULE | Refills: 0 | Status: SHIPPED | OUTPATIENT
Start: 2021-07-18 | End: 2021-07-25

## 2021-07-18 ASSESSMENT — PAIN SCALES - GENERAL: PAINLEVEL_OUTOF10: 8

## 2021-07-18 ASSESSMENT — ENCOUNTER SYMPTOMS
DIARRHEA: 0
SORE THROAT: 0
COUGH: 0
SHORTNESS OF BREATH: 0
ABDOMINAL PAIN: 1
BACK PAIN: 0
EYE DISCHARGE: 0
SINUS PRESSURE: 0
RHINORRHEA: 0
FACIAL SWELLING: 0
COLOR CHANGE: 0
EYE REDNESS: 0
VOMITING: 0
CHEST TIGHTNESS: 0
CONSTIPATION: 0
EYE PAIN: 0
NAUSEA: 1
TROUBLE SWALLOWING: 0
WHEEZING: 0
BLOOD IN STOOL: 0

## 2021-07-18 NOTE — ED PROVIDER NOTES
[address]  eMERGENCY dEPARTMENT eNCOUnter      Pt Name: Tiffany Chopra  MRN: [id number]  [place] 1950  Date of evaluation: 7/18/21      CHIEF COMPLAINT       Chief Complaint   Patient presents with    Abdominal Pain    Dizziness         HISTORY OF PRESENT ILLNESS    Tiffany Chopra is a 79 y.o. male who presents complaining of abdominal pain. Patient is here stating that he had surgery 3 days ago. He had a stent placed and had laser surgery to do break up his kidney stone. Patient states yesterday he did not take any of his pain medications was doing well but then last night started developing some severe pain in his lower abdomen. Patient states that he still did not take any of his pain medication. Patient was still having pain this morning and some nausea so he decided to come in. At the time my evaluation he states that he has no pain and no symptoms. Patient thinks that maybe it was him passing his kidney stone. Patient is supposed to take out his stent tonight. REVIEW OF SYSTEMS       Review of Systems   Constitutional: Negative for activity change, appetite change, chills, diaphoresis and fever. HENT: Negative for congestion, ear pain, facial swelling, nosebleeds, rhinorrhea, sinus pressure, sore throat and trouble swallowing. Eyes: Negative for pain, discharge and redness. Respiratory: Negative for cough, chest tightness, shortness of breath and wheezing. Cardiovascular: Negative for chest pain, palpitations and leg swelling. Gastrointestinal: Positive for abdominal pain and nausea. Negative for blood in stool, constipation, diarrhea and vomiting. Genitourinary: Negative for difficulty urinating, dysuria, flank pain, frequency, genital sores and hematuria. Musculoskeletal: Negative for arthralgias, back pain, gait problem, joint swelling, myalgias and neck pain. Skin: Negative for color change, pallor, rash and wound.    Neurological: Negative for dizziness, tremors, seizures, syncope, speech difficulty, weakness, numbness and headaches. Psychiatric/Behavioral: Negative for confusion, decreased concentration, hallucinations, self-injury, sleep disturbance and suicidal ideas. PAST MEDICAL HISTORY     Past Medical History:   Diagnosis Date    Hypertension     Kidney stones     kidney stones x 2 episodes       SURGICAL HISTORY       Past Surgical History:   Procedure Laterality Date    CYSTOSCOPY  07/09/2021    with stent insertion    CYSTOSCOPY Left 7/15/2021    CYSTOSCOPY URETEROSCOPY HOLMIUM LASER WITH STENT CHANGE performed by Bertram Montalvo MD at [facility] Bilateral 2010    cataracts    VASECTOMY         CURRENT MEDICATIONS       Previous Medications    ACETAMINOPHEN (TYLENOL) 500 MG TABLET    Take 500 mg by mouth every 6 hours as needed for Pain    AMLODIPINE (NORVASC) 5 MG TABLET    Take 1 tablet by mouth daily    ATENOLOL (TENORMIN) 25 MG TABLET    Take 1 tablet by mouth 2 times daily    TAMSULOSIN (FLOMAX) 0.4 MG CAPSULE    Take 1 capsule by mouth daily Take one capsule daily to facilitate passage of ureteral stone       ALLERGIES     has No Known Allergies. SOCIAL HISTORY      reports that he has never smoked. He has never used smokeless tobacco. He reports that he does not drink alcohol and does not use drugs. PHYSICAL EXAM     INITIAL VITALS: BP (!) 161/85   Pulse 102   Temp 98.3 °F (36.8 °C) (Oral)   Resp 16   Ht 5' 10\" (1.778 m)   Wt 240 lb (108.9 kg)   SpO2 97%   BMI 34.44 kg/m²      Physical Exam  Vitals and nursing note reviewed. Constitutional:       General: He is not in acute distress. Appearance: He is well-developed. He is not diaphoretic. HENT:      Head: Normocephalic and atraumatic. Eyes:      General: No scleral icterus. Right eye: No discharge. Left eye: No discharge. Conjunctiva/sclera: Conjunctivae normal.      Pupils: Pupils are equal, round, and reactive to light. Cardiovascular:      Rate and Rhythm: Normal rate and regular rhythm. Heart sounds: Normal heart sounds. No murmur heard. No friction rub. No gallop. Pulmonary:      Effort: Pulmonary effort is normal. No respiratory distress. Breath sounds: Normal breath sounds. No wheezing or rales. Chest:      Chest wall: No tenderness. Abdominal:      General: Bowel sounds are normal. There is no distension. Palpations: Abdomen is soft. There is no mass. Tenderness: There is no abdominal tenderness. There is no guarding or rebound. Musculoskeletal:         General: No tenderness. Normal range of motion. Skin:     General: Skin is warm and dry. Coloration: Skin is not pale. Findings: No erythema or rash. Neurological:      Mental Status: He is alert and oriented to person, place, and time. Cranial Nerves: No cranial nerve deficit. Sensory: No sensory deficit. Motor: No abnormal muscle tone. Coordination: Coordination normal.      Deep Tendon Reflexes: Reflexes normal.   Psychiatric:         Behavior: Behavior normal.         Thought Content: Thought content normal.         Judgment: Judgment normal.         DIAGNOSTIC RESULTS     RADIOLOGY:All plain film, CT,MRI, and formal ultrasound images (except ED bedside ultrasound) are read by the radiologist and the interpretations are directly viewed by the emergency physician. LABS: All lab results were reviewed by myself, and all abnormals are listed below. Labs Reviewed   URINE RT REFLEX TO CULTURE - Abnormal; Notable for the following components:       Result Value    Color, UA RED (*)     Turbidity UA TURBID (*)     Bilirubin Urine   (*)     Value: Presumptive positive. Unable to confirm due to unavailability of reagent.     Ketones, Urine SMALL (*)     Urine Hgb LARGE (*)     Protein, UA 4+ (*)     Nitrite, Urine POSITIVE (*)     Leukocyte Esterase, Urine LARGE (*)     All other components within normal limits   MICROSCOPIC URINALYSIS - Abnormal; Notable for the following components:    Bacteria, UA MANY (*)     All other components within normal limits   CULTURE, URINE         MEDICAL DECISION MAKING:     We will check his urine. Patient symptoms have resided most likely it was him passing the pieces of stone that he had surgery on. EMERGENCY DEPARTMENT COURSE:   Vitals:    Vitals:    07/18/21 0909   BP: (!) 161/85   Pulse: 102   Resp: 16   Temp: 98.3 °F (36.8 °C)   TempSrc: Oral   SpO2: 97%   Weight: 240 lb (108.9 kg)   Height: 5' 10\" (1.778 m)       The patient was given the following medications while in the emergency department:  Orders Placed This Encounter   Medications    cephALEXin (KEFLEX) 500 MG capsule     Sig: Take 1 capsule by mouth 2 times daily for 7 days     Dispense:  14 capsule     Refill:  0       -------------------------  11:03 AM EDT  Patient was updated on results and we will start him on antibiotics. CONSULTS:  None    PROCEDURES:  None    FINAL IMPRESSION      1.  Acute cystitis with hematuria          DISPOSITION/PLAN   DISPOSITION Decision To Discharge 07/18/2021 11:02:47 AM      PATIENT REFERREDTO:  Rocky Orr, APRN - CNP  [address]  [phone]    In 1 week      [facility] [address]  [phone]    In 3 days      [facility] ED  [facility] [address] [phone]    If symptoms worsen      DISCHARGEMEDICATIONS:  New Prescriptions    CEPHALEXIN (KEFLEX) 500 MG CAPSULE    Take 1 capsule by mouth 2 times daily for 7 days       (Please note that portions of this note were completed with a voice recognition program.  Efforts were made to edit thedictations but occasionally words are mis-transcribed.)    Garland Mack MD  Attending Emergency Physician                        Garland Mack MD  07/18/21 [id number]

## 2021-07-19 ENCOUNTER — OFFICE VISIT (OUTPATIENT)
Dept: PRIMARY CARE CLINIC | Age: 71
End: 2021-07-19
Payer: MEDICARE

## 2021-07-19 VITALS
OXYGEN SATURATION: 96 % | TEMPERATURE: 99.8 F | HEART RATE: 96 BPM | SYSTOLIC BLOOD PRESSURE: 146 MMHG | WEIGHT: 240.5 LBS | BODY MASS INDEX: 34.51 KG/M2 | DIASTOLIC BLOOD PRESSURE: 78 MMHG

## 2021-07-19 DIAGNOSIS — N20.0 KIDNEY STONE: Primary | ICD-10-CM

## 2021-07-19 DIAGNOSIS — N30.01 ACUTE CYSTITIS WITH HEMATURIA: ICD-10-CM

## 2021-07-19 DIAGNOSIS — R11.2 NON-INTRACTABLE VOMITING WITH NAUSEA, UNSPECIFIED VOMITING TYPE: ICD-10-CM

## 2021-07-19 DIAGNOSIS — Z79.899 HIGH RISK MEDICATION USE: ICD-10-CM

## 2021-07-19 PROBLEM — N23 URETER COLIC: Status: ACTIVE | Noted: 2021-07-09

## 2021-07-19 PROCEDURE — 4040F PNEUMOC VAC/ADMIN/RCVD: CPT | Performed by: NURSE PRACTITIONER

## 2021-07-19 PROCEDURE — G8417 CALC BMI ABV UP PARAM F/U: HCPCS | Performed by: NURSE PRACTITIONER

## 2021-07-19 PROCEDURE — 1036F TOBACCO NON-USER: CPT | Performed by: NURSE PRACTITIONER

## 2021-07-19 PROCEDURE — 1123F ACP DISCUSS/DSCN MKR DOCD: CPT | Performed by: NURSE PRACTITIONER

## 2021-07-19 PROCEDURE — 99204 OFFICE O/P NEW MOD 45 MIN: CPT | Performed by: NURSE PRACTITIONER

## 2021-07-19 PROCEDURE — 3017F COLORECTAL CA SCREEN DOC REV: CPT | Performed by: NURSE PRACTITIONER

## 2021-07-19 PROCEDURE — G8427 DOCREV CUR MEDS BY ELIG CLIN: HCPCS | Performed by: NURSE PRACTITIONER

## 2021-07-19 RX ORDER — OXYCODONE HYDROCHLORIDE AND ACETAMINOPHEN 5; 325 MG/1; MG/1
2 TABLET ORAL EVERY 6 HOURS PRN
Qty: 24 TABLET | Refills: 0 | Status: SHIPPED | OUTPATIENT
Start: 2021-07-19 | End: 2021-07-22

## 2021-07-19 RX ORDER — ONDANSETRON 8 MG/1
8 TABLET, ORALLY DISINTEGRATING ORAL EVERY 8 HOURS PRN
Qty: 30 TABLET | Refills: 1 | Status: SHIPPED | OUTPATIENT
Start: 2021-07-19 | End: 2021-09-23

## 2021-07-19 SDOH — ECONOMIC STABILITY: FOOD INSECURITY: WITHIN THE PAST 12 MONTHS, YOU WORRIED THAT YOUR FOOD WOULD RUN OUT BEFORE YOU GOT MONEY TO BUY MORE.: PATIENT DECLINED

## 2021-07-19 SDOH — ECONOMIC STABILITY: FOOD INSECURITY: WITHIN THE PAST 12 MONTHS, THE FOOD YOU BOUGHT JUST DIDN'T LAST AND YOU DIDN'T HAVE MONEY TO GET MORE.: PATIENT DECLINED

## 2021-07-19 ASSESSMENT — ENCOUNTER SYMPTOMS
SINUS PAIN: 0
COUGH: 0
DIARRHEA: 0
BACK PAIN: 1
SHORTNESS OF BREATH: 0

## 2021-07-19 ASSESSMENT — PATIENT HEALTH QUESTIONNAIRE - PHQ9
SUM OF ALL RESPONSES TO PHQ QUESTIONS 1-9: 0
1. LITTLE INTEREST OR PLEASURE IN DOING THINGS: 0
SUM OF ALL RESPONSES TO PHQ9 QUESTIONS 1 & 2: 0
2. FEELING DOWN, DEPRESSED OR HOPELESS: 0
SUM OF ALL RESPONSES TO PHQ QUESTIONS 1-9: 0
SUM OF ALL RESPONSES TO PHQ QUESTIONS 1-9: 0

## 2021-07-19 ASSESSMENT — SOCIAL DETERMINANTS OF HEALTH (SDOH): HOW HARD IS IT FOR YOU TO PAY FOR THE VERY BASICS LIKE FOOD, HOUSING, MEDICAL CARE, AND HEATING?: PATIENT DECLINED

## 2021-07-19 NOTE — PROGRESS NOTES
[address] [address] PRIMARY CARE  [facility] [address]  [facility] [place] [address]  Dept: [phone]    Mikey Ralph is a 79 y.o. male Established patient, who presents today for his medical conditions/complaints as noted below. Chief Complaint   Patient presents with    Nausea     dry heaving - no vomit.  Follow-up     low grade fever today - pt c/o chills    Groin Pain     left side - had lower left side back pain yesterday - reports pain being much lower today. HPI:     HPI  He was at ER yesterday and diagnosed with UTI. State pain is mostly down in penis area. He had surgery on 7/15/21. He had a cystoscopy uteteroscopy holmium laser with stent change. He still has stent in place. He is getting a lot of grit in his straining. The screen is getting clogged. He still has stent in place. He is having intense pain. It effects his breathing and sleeping. When he has the pain he has to walk. He taking percocet for pain. He states it does not help much when pain is bad. He is also on Flomax and Keflex.   He sees Dr. Paco Hercules , cardiologist.    Reviewed prior notes urology  Reviewed previous Labs, Imaging and Hospital Records    LDL Cholesterol (mg/dL)   Date Value   07/23/2018 122   10/15/2015 121       (goal LDL is <100)   AST (U/L)   Date Value   05/31/2016 22     ALT (U/L)   Date Value   05/31/2016 26     BUN (mg/dL)   Date Value   07/23/2018 13     BP Readings from Last 3 Encounters:   07/19/21 (!) 146/78   07/18/21 (!) 161/85   07/15/21 121/77          (goal 120/80)    Past Medical History:   Diagnosis Date    Bradycardia     Hypertension     Kidney stones     kidney stones x 2 episodes      Past Surgical History:   Procedure Laterality Date    CYSTOSCOPY  07/09/2021    with stent insertion    CYSTOSCOPY Left 7/15/2021    CYSTOSCOPY URETEROSCOPY HOLMIUM LASER WITH STENT CHANGE performed by Asael Sloan MD at [address] [facility] Bilateral 2010 cataracts    KIDNEY STONE REMOVAL      VASECTOMY         Family History   Problem Relation Age of Onset    High Blood Pressure Mother     Cancer Father        Social History     Tobacco Use    Smoking status: Never Smoker    Smokeless tobacco: Never Used   Substance Use Topics    Alcohol use: No     Alcohol/week: 0.0 standard drinks     Comment: if any, social occasional      Current Outpatient Medications   Medication Sig Dispense Refill    ondansetron (ZOFRAN ODT) 8 MG TBDP disintegrating tablet Place 1 tablet under the tongue every 8 hours as needed for Nausea or Vomiting 30 tablet 1    oxyCODONE-acetaminophen (PERCOCET) 5-325 MG per tablet Take 2 tablets by mouth every 6 hours as needed for Pain for up to 3 days. Intended supply: 3 days. Take lowest dose possible to manage pain 24 tablet 0    cephALEXin (KEFLEX) 500 MG capsule Take 1 capsule by mouth 2 times daily for 7 days 14 capsule 0    tamsulosin (FLOMAX) 0.4 MG capsule Take 1 capsule by mouth daily Take one capsule daily to facilitate passage of ureteral stone 14 capsule 0    acetaminophen (TYLENOL) 500 MG tablet Take 500 mg by mouth every 6 hours as needed for Pain      atenolol (TENORMIN) 25 MG tablet Take 1 tablet by mouth 2 times daily 180 tablet 0    amLODIPine (NORVASC) 5 MG tablet Take 1 tablet by mouth daily 30 tablet 0     No current facility-administered medications for this visit.      No Known Allergies    Health Maintenance   Topic Date Due    Hepatitis C screen  Never done    COVID-19 Vaccine (1) Never done    DTaP/Tdap/Td vaccine (1 - Tdap) Never done    Diabetes screen  Never done    Colon cancer screen colonoscopy  Never done    Shingles Vaccine (1 of 2) Never done    Pneumococcal 65+ years Vaccine (1 of 1 - PPSV23) Never done   [facility] Wellness Visit (AWV)  Never done    Flu vaccine (1) 09/01/2021    Lipid screen  07/23/2023    Hepatitis A vaccine  Aged Out    Hepatitis B vaccine  Aged Out    Hib vaccine  Aged Out    Meningococcal (ACWY) vaccine  Aged Out       Subjective:      Review of Systems   Constitutional: Positive for chills and fatigue. HENT: Negative for congestion and sinus pain. Respiratory: Negative for cough and shortness of breath. Cardiovascular: Negative for chest pain and palpitations. Gastrointestinal: Negative for diarrhea. Genitourinary: Positive for dysuria, frequency and hematuria. Musculoskeletal: Positive for back pain and gait problem. Skin: Negative for rash and wound. Neurological: Negative for dizziness and headaches. Psychiatric/Behavioral: Positive for sleep disturbance. Negative for dysphoric mood. The patient is nervous/anxious. Objective:     BP (!) 146/78   Pulse 96   Temp 99.8 °F (37.7 °C)   Wt 240 lb 8 oz (109.1 kg)   SpO2 96%   BMI 34.51 kg/m²   Physical Exam  Vitals and nursing note reviewed. Constitutional:       General: He is in acute distress. HENT:      Head: Normocephalic and atraumatic. Right Ear: Tympanic membrane, ear canal and external ear normal.      Left Ear: Tympanic membrane, ear canal and external ear normal.      Nose: Nose normal.   Eyes:      Conjunctiva/sclera: Conjunctivae normal.      Pupils: Pupils are equal, round, and reactive to light. Cardiovascular:      Rate and Rhythm: Normal rate and regular rhythm. Heart sounds: Normal heart sounds. Pulmonary:      Breath sounds: Normal breath sounds. Abdominal:      General: Bowel sounds are normal.      Tenderness: There is right CVA tenderness and left CVA tenderness. Musculoskeletal:      Right lower leg: No edema. Left lower leg: No edema. Skin:     General: Skin is warm. Neurological:      Mental Status: He is alert and oriented to person, place, and time. Cranial Nerves: No cranial nerve deficit. Psychiatric:         Mood and Affect: Mood is anxious.          Cognition and Memory: Cognition and memory normal.     Controlled substances monitoring: possible medication side effects, risk of tolerance and/or dependence, and alternative treatments discussed, no signs of potential drug abuse or diversion identified, OARRS report reviewed today- activity consistent with treatment plan, medication contract signed today and random urine drug screen sent today. Assessment/Plan:   1. Kidney stone  -     oxyCODONE-acetaminophen (PERCOCET) 5-325 MG per tablet; Take 2 tablets by mouth every 6 hours as needed for Pain for up to 3 days. Intended supply: 3 days. Take lowest dose possible to manage pain, Disp-24 tablet, R-0Normal  2. Acute cystitis with hematuria  3. Non-intractable vomiting with nausea, unspecified vomiting type  -     ondansetron (ZOFRAN ODT) 8 MG TBDP disintegrating tablet; Place 1 tablet under the tongue every 8 hours as needed for Nausea or Vomiting, Disp-30 tablet, R-1Normal  4. High risk medication use     Desirae checked with Dr. Eid Flow office and they would like stent removed. There for stent removed in office and tolerated well although still in pain. Continue to strain urine   Follow up with urologist  Take 1-2 percocet every 6 hours as needed for pain. Take lowest dose possible. Push fluids. Stool softener  Continue Keflex    Return in about 3 months (around 10/19/2021) for AWV, health maintenance. No orders of the defined types were placed in this encounter. Orders Placed This Encounter   Medications    ondansetron (ZOFRAN ODT) 8 MG TBDP disintegrating tablet     Sig: Place 1 tablet under the tongue every 8 hours as needed for Nausea or Vomiting     Dispense:  30 tablet     Refill:  1    oxyCODONE-acetaminophen (PERCOCET) 5-325 MG per tablet     Sig: Take 2 tablets by mouth every 6 hours as needed for Pain for up to 3 days. Intended supply: 3 days.  Take lowest dose possible to manage pain     Dispense:  24 tablet     Refill:  0     Reduce doses taken as pain becomes manageable       Patient given educational

## 2021-07-19 NOTE — PATIENT INSTRUCTIONS
Continue to strain urine   Follow up with urologist  Take 1-2 percocet every 6 hours as needed for pain. Take lowest dose possible. Push fluids.   Stool stoftner  Continue Keflex

## 2021-07-20 ENCOUNTER — TELEPHONE (OUTPATIENT)
Dept: PRIMARY CARE CLINIC | Age: 71
End: 2021-07-20

## 2021-07-20 DIAGNOSIS — N30.01 ACUTE CYSTITIS WITH HEMATURIA: Primary | ICD-10-CM

## 2021-07-20 DIAGNOSIS — R11.2 NON-INTRACTABLE VOMITING WITH NAUSEA, UNSPECIFIED VOMITING TYPE: ICD-10-CM

## 2021-07-20 LAB
CULTURE: ABNORMAL
Lab: ABNORMAL
SPECIMEN DESCRIPTION: ABNORMAL

## 2021-07-20 RX ORDER — CIPROFLOXACIN 500 MG/1
500 TABLET, FILM COATED ORAL 2 TIMES DAILY
Qty: 20 TABLET | Refills: 0 | Status: SHIPPED | OUTPATIENT
Start: 2021-07-20 | End: 2021-07-30

## 2021-07-20 RX ORDER — PROMETHAZINE HYDROCHLORIDE 25 MG/1
25 TABLET ORAL 4 TIMES DAILY PRN
Qty: 20 TABLET | Refills: 0 | Status: SHIPPED | OUTPATIENT
Start: 2021-07-20 | End: 2021-07-27

## 2021-07-20 RX ORDER — GREEN TEA/HOODIA GORDONII 315-12.5MG
1 CAPSULE ORAL 2 TIMES DAILY
Qty: 30 TABLET | Refills: 0 | Status: SHIPPED | OUTPATIENT
Start: 2021-07-20 | End: 2021-08-19

## 2021-07-20 NOTE — TELEPHONE ENCOUNTER
Pt called checking on his urine culture results, I advised pt we do have them in chart, but they have not yet been resulted on. He is asking if you could please personally call him because he has some concerns he would like to discuss with you and is very worried.      Please call pt at [phone]

## 2021-07-20 NOTE — TELEPHONE ENCOUNTER
States he is feeling poorly. He thinks stones are gone. He thinks the pain is from nausea. He stopped taking Percocet but is still having nausea. Zofran worked well first couple of times, but does not seem to be working well now. Pseudomonas UTI will send in Cipro. Will send promethazine for nausea and probiotic. Patient voiced understanding.

## 2021-07-21 NOTE — PROGRESS NOTES
Pharmacy note:    Patient's urine culture is positive for Pseudomonas sensitive to Cipro. Contacted patient at preferred number to discuss test results and treatment plan. Patient reports his PCP gave him a prescription for ciprofloxacin today which he has picked up and started taking.      Thank you,  Carmen Gaines, PharmD, BCPS  [phone]

## 2021-07-21 NOTE — TELEPHONE ENCOUNTER
Patients wife is calling and states that the patient was told to continue both the Keflex and Cipro last night by you. She states [address] [place] [address] called her after they got off the phone with you and told her to discontinue the Keflex. Please advise? Should the patient continue both of his antibiotics.

## 2021-07-21 NOTE — TELEPHONE ENCOUNTER
The sensitivity showed the Cipro should kill the organism in his urine. Research shows that a cephalosporin (Keflex) in combination with a fluoroquinolone (Cipro) works better than one antibiotic alone in killing pseudomonas (the organism in his urine). I told him to take the probiotic because taking both antibiotics is hard on the system but the probiotic will help. With as much trouble has he has had this situation, my thought was hit it hard and get rid of it. He will not be wrong in follow my advise or the hospital advise. Sorry, if the technical terms confuse anyone but I just wanted to try to explain the reasoning behind the two different answers.

## 2021-09-13 ENCOUNTER — TELEPHONE (OUTPATIENT)
Dept: PRIMARY CARE CLINIC | Age: 71
End: 2021-09-13

## 2021-09-13 ENCOUNTER — OFFICE VISIT (OUTPATIENT)
Dept: PRIMARY CARE CLINIC | Age: 71
End: 2021-09-13
Payer: MEDICARE

## 2021-09-13 ENCOUNTER — HOSPITAL ENCOUNTER (OUTPATIENT)
Age: 71
Setting detail: SPECIMEN
Discharge: HOME | End: 2021-09-13
Payer: MEDICARE

## 2021-09-13 VITALS
HEART RATE: 93 BPM | SYSTOLIC BLOOD PRESSURE: 118 MMHG | DIASTOLIC BLOOD PRESSURE: 84 MMHG | WEIGHT: 233.8 LBS | HEIGHT: 70 IN | BODY MASS INDEX: 33.47 KG/M2 | OXYGEN SATURATION: 95 %

## 2021-09-13 DIAGNOSIS — R10.9 FLANK PAIN: Primary | ICD-10-CM

## 2021-09-13 DIAGNOSIS — R50.9 FEVER, UNSPECIFIED FEVER CAUSE: ICD-10-CM

## 2021-09-13 DIAGNOSIS — R93.89 ABNORMAL CXR: ICD-10-CM

## 2021-09-13 LAB
BILIRUBIN, POC: NORMAL
BLOOD URINE, POC: NEGATIVE
CLARITY, POC: CLEAR
COLOR, POC: NORMAL
GLUCOSE URINE, POC: NEGATIVE
KETONES, POC: NORMAL
LEUKOCYTE EST, POC: NEGATIVE
NITRITE, POC: NEGATIVE
PH, POC: 5.5
PROTEIN, POC: NORMAL
SPECIFIC GRAVITY, POC: >=1.03
UROBILINOGEN, POC: NORMAL

## 2021-09-13 PROCEDURE — 81002 URINALYSIS NONAUTO W/O SCOPE: CPT | Performed by: PHYSICIAN ASSISTANT

## 2021-09-13 PROCEDURE — 99214 OFFICE O/P EST MOD 30 MIN: CPT | Performed by: PHYSICIAN ASSISTANT

## 2021-09-13 PROCEDURE — 1036F TOBACCO NON-USER: CPT | Performed by: PHYSICIAN ASSISTANT

## 2021-09-13 PROCEDURE — G8417 CALC BMI ABV UP PARAM F/U: HCPCS | Performed by: PHYSICIAN ASSISTANT

## 2021-09-13 PROCEDURE — G8427 DOCREV CUR MEDS BY ELIG CLIN: HCPCS | Performed by: PHYSICIAN ASSISTANT

## 2021-09-13 PROCEDURE — 3017F COLORECTAL CA SCREEN DOC REV: CPT | Performed by: PHYSICIAN ASSISTANT

## 2021-09-13 PROCEDURE — 1123F ACP DISCUSS/DSCN MKR DOCD: CPT | Performed by: PHYSICIAN ASSISTANT

## 2021-09-13 PROCEDURE — 4040F PNEUMOC VAC/ADMIN/RCVD: CPT | Performed by: PHYSICIAN ASSISTANT

## 2021-09-13 RX ORDER — CIPROFLOXACIN 500 MG/1
500 TABLET, FILM COATED ORAL 2 TIMES DAILY
Qty: 14 TABLET | Refills: 0 | Status: SHIPPED | OUTPATIENT
Start: 2021-09-13 | End: 2021-09-20

## 2021-09-13 ASSESSMENT — ENCOUNTER SYMPTOMS
CONSTIPATION: 0
CHEST TIGHTNESS: 0
SHORTNESS OF BREATH: 0
SORE THROAT: 0
ABDOMINAL PAIN: 0
RHINORRHEA: 0
COUGH: 0
EYE DISCHARGE: 0
PHOTOPHOBIA: 0
DIARRHEA: 1
ABDOMINAL DISTENTION: 0
VOMITING: 0
SINUS PRESSURE: 0

## 2021-09-13 NOTE — TELEPHONE ENCOUNTER
----- Message from Naeem Collins sent at 9/13/2021  8:50 AM EDT -----  Subject: Message to Provider    QUESTIONS  Information for Provider? Pt is requesting that Susa Severe send in a   antibiotic for an UTI. He has been having pain in right side, nausea,   fever just like he had when he had this two months ago. Please call pt and   let him know if something can be called in. The antibiotic he was given   before was ciprofloxacin (CIPRO) 500 MG tablet and cephALEXin (KEFLEX) 500   MG capsule. Pharmacy? [facility] [place]. He will come in for a Urine   culture in needed. ---------------------------------------------------------------------------  --------------  Juan Jose Fearing INFO  What is the best way for the office to contact you? OK to leave message on   voicemail  Preferred Call Back Phone Number? [phone]  ---------------------------------------------------------------------------  --------------  SCRIPT ANSWERS  Relationship to Patient?  Self

## 2021-09-13 NOTE — PROGRESS NOTES
[address] [facility] PRIMARY CARE  [address]  [facility] [address]  Dept: Deo Lee is a 79 y.o. male Established patient, who presents today for his medical conditions/complaints as noted below. Chief Complaint   Patient presents with    Urinary Tract Infection     pt had a kidney stone, they put a stint in and pt had a bad UTI x2 months ago. Pt c/o burning, lower back pain, rt side pain, nausea, fever and chills. pt has tried cranberry juice and it's not working       HPI:     HPI: The patient is a pleasant 60-year-old male who presents today with concerns of flank pain and fever. The patient had a stone which needed stent and broken up. He then had an infection. He is having that same feeling now. He had fever chills nausea and pain on right side. This started a couple weeks ago. Has tried cranberry juice. His nausea is not quite as bad. One episode of loose bwel. POCT urine did not show any signs of infection at this time. No blood in the urine either. Patient does have a history of gallstones.     Reviewed prior notes None  Reviewed previous Labs    LDL Cholesterol (mg/dL)   Date Value   07/23/2018 122   10/15/2015 121       (goal LDL is <100)   AST (U/L)   Date Value   05/31/2016 22     ALT (U/L)   Date Value   05/31/2016 26     BUN (mg/dL)   Date Value   07/23/2018 13     BP Readings from Last 3 Encounters:   09/13/21 118/84   07/19/21 (!) 146/78   07/18/21 (!) 161/85          (goal 120/80)    Past Medical History:   Diagnosis Date    Bradycardia     Hypertension     Kidney stones     kidney stones x 2 episodes      Past Surgical History:   Procedure Laterality Date    CYSTOSCOPY  07/09/2021    with stent insertion    CYSTOSCOPY Left 7/15/2021    CYSTOSCOPY URETEROSCOPY HOLMIUM LASER WITH STENT CHANGE performed by Jeremias Jim MD at [address]  Bilateral 2010    cataracts   [address] Family History   Problem Relation Age of Onset    High Blood Pressure Mother     Cancer Father        Social History     Tobacco Use    Smoking status: Never Smoker    Smokeless tobacco: Never Used   Substance Use Topics    Alcohol use: No     Alcohol/week: 0.0 standard drinks     Comment: if any, social occasional      Current Outpatient Medications   Medication Sig Dispense Refill    ciprofloxacin (CIPRO) 500 MG tablet Take 1 tablet by mouth 2 times daily for 7 days 14 tablet 0    acetaminophen (TYLENOL) 500 MG tablet Take 500 mg by mouth every 6 hours as needed for Pain      atenolol (TENORMIN) 25 MG tablet Take 1 tablet by mouth 2 times daily 180 tablet 0    amLODIPine (NORVASC) 5 MG tablet Take 1 tablet by mouth daily 30 tablet 0    ondansetron (ZOFRAN ODT) 8 MG TBDP disintegrating tablet Place 1 tablet under the tongue every 8 hours as needed for Nausea or Vomiting (Patient not taking: Reported on 9/13/2021) 30 tablet 1    tamsulosin (FLOMAX) 0.4 MG capsule Take 1 capsule by mouth daily Take one capsule daily to facilitate passage of ureteral stone (Patient not taking: Reported on 9/13/2021) 14 capsule 0     No current facility-administered medications for this visit. No Known Allergies    Health Maintenance   Topic Date Due    Hepatitis C screen  Never done    COVID-19 Vaccine (1) Never done    DTaP/Tdap/Td vaccine (1 - Tdap) Never done    Diabetes screen  Never done    Colon cancer screen colonoscopy  Never done    Shingles Vaccine (1 of 2) Never done    Pneumococcal 65+ years Vaccine (1 of 1 - PPSV23) Never done    Flu vaccine (1) Never done    Lipid screen  07/23/2023    Hepatitis A vaccine  Aged Out    Hepatitis B vaccine  Aged Out    Hib vaccine  Aged Out    Meningococcal (ACWY) vaccine  Aged Out       Subjective:      Review of Systems   Constitutional: Positive for appetite change, chills and fever. Negative for unexpected weight change.    HENT: Negative for congestion, hearing loss, rhinorrhea, sinus pressure and sore throat. Eyes: Negative for photophobia, discharge and visual disturbance. Respiratory: Negative for cough, chest tightness and shortness of breath. Cardiovascular: Negative for chest pain, palpitations and leg swelling. Gastrointestinal: Positive for diarrhea. Negative for abdominal distention, abdominal pain, constipation and vomiting. Endocrine: Negative for polydipsia and polyuria. Genitourinary: Positive for dysuria and flank pain. Negative for decreased urine volume, difficulty urinating, frequency and urgency. Musculoskeletal: Negative for arthralgias, gait problem and myalgias. Skin: Negative for rash. Allergic/Immunologic: Negative for food allergies. Neurological: Negative for dizziness, weakness, numbness and headaches. Hematological: Negative for adenopathy. Psychiatric/Behavioral: Negative for dysphoric mood and sleep disturbance. The patient is not nervous/anxious. Objective:     /84   Pulse 93   Ht 5' 10\" (1.778 m)   Wt 233 lb 12.8 oz (106.1 kg)   SpO2 95%   BMI 33.55 kg/m²   Physical Exam  Constitutional:       General: He is not in acute distress. Appearance: Normal appearance. He is not ill-appearing. HENT:      Head: Normocephalic and atraumatic. Right Ear: External ear normal.      Left Ear: External ear normal.      Nose: Nose normal.      Mouth/Throat:      Mouth: Mucous membranes are moist.   Eyes:      Extraocular Movements: Extraocular movements intact. Conjunctiva/sclera: Conjunctivae normal.      Pupils: Pupils are equal, round, and reactive to light. Neck:      Vascular: No carotid bruit. Cardiovascular:      Rate and Rhythm: Normal rate and regular rhythm. Pulses: Normal pulses. Heart sounds: Normal heart sounds. Pulmonary:      Effort: Pulmonary effort is normal. No respiratory distress. Breath sounds: Normal breath sounds.    Abdominal: General: Bowel sounds are normal. There is no distension. Tenderness: There is abdominal tenderness (RUQ palpation causes pain in right flank. ). Musculoskeletal:         General: Normal range of motion. Cervical back: Normal range of motion and neck supple. Lymphadenopathy:      Cervical: No cervical adenopathy. Skin:     General: Skin is warm and dry. Neurological:      General: No focal deficit present. Mental Status: He is alert and oriented to person, place, and time. Psychiatric:         Mood and Affect: Mood normal.         Behavior: Behavior normal.         Thought Content: Thought content normal.         Assessment and Plan:          1. Flank pain  -     ciprofloxacin (CIPRO) 500 MG tablet; Take 1 tablet by mouth 2 times daily for 7 days, Disp-14 tablet, R-0Normal  -     CT ABDOMEN PELVIS W IV CONTRAST Additional Contrast? Radiologist Recommendation; Future  -     CBC Auto Differential; Future  -     Comprehensive Metabolic Panel; Future  -     Culture, Urine; Future  2. Fever, unspecified fever cause  -     CT ABDOMEN PELVIS W IV CONTRAST Additional Contrast? Radiologist Recommendation; Future  -     CBC Auto Differential; Future  -     Comprehensive Metabolic Panel; Future  -     Culture, Urine; Future             Patient given educational materials - see patient instructions. Discussed use, benefit, and side effects of prescribed medications. All patient questions answered. Pt voiced understanding. Reviewed health maintenance. Instructed to continue current medications, diet and exercise. Patient agreed with treatment plan. Follow up as directed.      Electronically signed by ADDI Atkinson on 9/13/2021 at 5:24 PM

## 2021-09-14 DIAGNOSIS — R10.9 FLANK PAIN: ICD-10-CM

## 2021-09-14 DIAGNOSIS — R50.9 FEVER, UNSPECIFIED FEVER CAUSE: ICD-10-CM

## 2021-09-15 ENCOUNTER — HOSPITAL ENCOUNTER (OUTPATIENT)
Dept: CT IMAGING | Age: 71
Discharge: HOME | End: 2021-09-17
Payer: MEDICARE

## 2021-09-15 DIAGNOSIS — R50.9 FEVER, UNSPECIFIED FEVER CAUSE: ICD-10-CM

## 2021-09-15 DIAGNOSIS — R10.9 FLANK PAIN: ICD-10-CM

## 2021-09-15 LAB
BUN BLDV-MCNC: 20 MG/DL (ref 8–23)
CREAT SERPL-MCNC: 0.89 MG/DL (ref 0.7–1.2)
CULTURE: NORMAL
GFR AFRICAN AMERICAN: >60 ML/MIN
GFR NON-AFRICAN AMERICAN: >60 ML/MIN
GFR SERPL CREATININE-BSD FRML MDRD: NORMAL ML/MIN/{1.73_M2}
GFR SERPL CREATININE-BSD FRML MDRD: NORMAL ML/MIN/{1.73_M2}
Lab: NORMAL
SPECIMEN DESCRIPTION: NORMAL

## 2021-09-15 PROCEDURE — 2580000003 HC RX 258: Performed by: NURSE PRACTITIONER

## 2021-09-15 PROCEDURE — 84520 ASSAY OF UREA NITROGEN: CPT

## 2021-09-15 PROCEDURE — 36415 COLL VENOUS BLD VENIPUNCTURE: CPT

## 2021-09-15 PROCEDURE — 82565 ASSAY OF CREATININE: CPT

## 2021-09-15 PROCEDURE — 74177 CT ABD & PELVIS W/CONTRAST: CPT

## 2021-09-15 PROCEDURE — 6360000004 HC RX CONTRAST MEDICATION: Performed by: NURSE PRACTITIONER

## 2021-09-15 RX ORDER — 0.9 % SODIUM CHLORIDE 0.9 %
80 INTRAVENOUS SOLUTION INTRAVENOUS ONCE
Status: COMPLETED | OUTPATIENT
Start: 2021-09-15 | End: 2021-09-15

## 2021-09-15 RX ORDER — SODIUM CHLORIDE 0.9 % (FLUSH) 0.9 %
10 SYRINGE (ML) INJECTION PRN
Status: DISCONTINUED | OUTPATIENT
Start: 2021-09-15 | End: 2021-09-18 | Stop reason: HOSPADM

## 2021-09-15 RX ADMIN — SODIUM CHLORIDE 80 ML: 9 INJECTION, SOLUTION INTRAVENOUS at 13:50

## 2021-09-15 RX ADMIN — IOPAMIDOL 75 ML: 755 INJECTION, SOLUTION INTRAVENOUS at 13:50

## 2021-09-15 RX ADMIN — SODIUM CHLORIDE, PRESERVATIVE FREE 10 ML: 5 INJECTION INTRAVENOUS at 13:51

## 2021-09-15 RX ADMIN — IOHEXOL 50 ML: 240 INJECTION, SOLUTION INTRATHECAL; INTRAVASCULAR; INTRAVENOUS; ORAL at 13:50

## 2021-09-15 NOTE — RESULT ENCOUNTER NOTE
Call and advise patient that the results showed gallstone which could be causing patient pain. Continue with current treatment. There was also a pleural effusion for which I have ordered a CXR.

## 2021-09-16 ENCOUNTER — HOSPITAL ENCOUNTER (OUTPATIENT)
Dept: GENERAL RADIOLOGY | Age: 71
Discharge: HOME | End: 2021-09-18
Payer: MEDICARE

## 2021-09-16 ENCOUNTER — HOSPITAL ENCOUNTER (OUTPATIENT)
Age: 71
Discharge: HOME | End: 2021-09-18
Payer: MEDICARE

## 2021-09-16 DIAGNOSIS — R50.9 FEVER, UNSPECIFIED FEVER CAUSE: ICD-10-CM

## 2021-09-16 PROCEDURE — 71046 X-RAY EXAM CHEST 2 VIEWS: CPT

## 2021-09-17 ENCOUNTER — TELEPHONE (OUTPATIENT)
Dept: PRIMARY CARE CLINIC | Age: 71
End: 2021-09-17

## 2021-09-17 RX ORDER — AMOXICILLIN AND CLAVULANATE POTASSIUM 875; 125 MG/1; MG/1
1 TABLET, FILM COATED ORAL 2 TIMES DAILY
Qty: 20 TABLET | Refills: 0 | Status: SHIPPED | OUTPATIENT
Start: 2021-09-17 | End: 2021-09-27

## 2021-09-17 NOTE — TELEPHONE ENCOUNTER
Advise patient that his heart was a slightly enlarged no concerns there. Airspace disease is very nonspecific and that is why we need to repeat chest x-ray. Likely just inflammation which will be cured by antibiotics. We will know better and 3 weeks with repeat chest x-ray if patient is still having symptoms he can come in to be rechecked.

## 2021-09-17 NOTE — RESULT ENCOUNTER NOTE
Call and advise patient that the results showed bilateral airspace disease which could be infectious. I will start a new antibiotic and then patient will need a repeat check X ray in 3 weeks to determine resolution.

## 2021-09-23 ENCOUNTER — TELEMEDICINE (OUTPATIENT)
Dept: PRIMARY CARE CLINIC | Age: 71
End: 2021-09-23
Payer: MEDICARE

## 2021-09-23 DIAGNOSIS — R93.89 ABNORMAL CXR: Primary | ICD-10-CM

## 2021-09-23 DIAGNOSIS — R05.9 COUGH: ICD-10-CM

## 2021-09-23 PROCEDURE — 1123F ACP DISCUSS/DSCN MKR DOCD: CPT | Performed by: NURSE PRACTITIONER

## 2021-09-23 PROCEDURE — 99213 OFFICE O/P EST LOW 20 MIN: CPT | Performed by: NURSE PRACTITIONER

## 2021-09-23 PROCEDURE — G8417 CALC BMI ABV UP PARAM F/U: HCPCS | Performed by: NURSE PRACTITIONER

## 2021-09-23 PROCEDURE — 1036F TOBACCO NON-USER: CPT | Performed by: NURSE PRACTITIONER

## 2021-09-23 PROCEDURE — 3017F COLORECTAL CA SCREEN DOC REV: CPT | Performed by: NURSE PRACTITIONER

## 2021-09-23 PROCEDURE — G8427 DOCREV CUR MEDS BY ELIG CLIN: HCPCS | Performed by: NURSE PRACTITIONER

## 2021-09-23 PROCEDURE — 4040F PNEUMOC VAC/ADMIN/RCVD: CPT | Performed by: NURSE PRACTITIONER

## 2021-09-23 RX ORDER — BENZONATATE 200 MG/1
200 CAPSULE ORAL 3 TIMES DAILY PRN
Qty: 30 CAPSULE | Refills: 0 | Status: SHIPPED | OUTPATIENT
Start: 2021-09-23 | End: 2021-10-03

## 2021-09-23 ASSESSMENT — ENCOUNTER SYMPTOMS
COUGH: 1
NAUSEA: 0
DIARRHEA: 0
BACK PAIN: 0
CONSTIPATION: 0

## 2021-09-23 NOTE — PROGRESS NOTES
[address] [facility] PRIMARY CARE  [address] [facility] [address]  Dept: Deo Lee is a 79 y.o. male Established patient, who presents today for his medical conditions/complaints as noted below. Chief Complaint   Patient presents with    Cough     2 weeks ago    Fatigue    Fever     unconfirmed - pt has no thermometer. HPI:     Cough  This is a new problem. Episode onset: since 9/12/2021. Associated symptoms include a fever. Pertinent negatives include no chest pain, ear pain, headaches or rash. The symptoms are aggravated by lying down (light activity). Fatigue  Associated symptoms include coughing, fatigue and a fever. Pertinent negatives include no chest pain, congestion, headaches, nausea or rash. Fever   This is a new problem. The current episode started 1 to 4 weeks ago. The problem has been waxing and waning. His temperature was unmeasured (worse at night) prior to arrival. Associated symptoms include coughing. Pertinent negatives include no chest pain, congestion, diarrhea, ear pain, headaches, nausea, rash or urinary pain. He is currently taking Augment for a couple more days. He is better and his fever is not as bad  Appetite is improving. He had a pian on the left side that was caused by lung and that has improved. He is taking mucinex 1200 mg    Reviewed prior notes Previous PCP  Reviewed previous Imaging 9/15/21 & 9/16/21    John Eleazar, was evaluated through a synchronous (real-time) audio-video encounter. The patient (or guardian if applicable) is aware that this is a billable service. Verbal consent to proceed has been obtained within the past 12 months. The visit was conducted pursuant to the emergency declaration under the [facility], [address] [place] [address] authority and the [employer] Act.   Patient identification was verified, and a caregiver was present when appropriate. The patient was located in a state where the provider was credentialed to provide care. Total time spent for this encounter: Not billed by time    --SOLEDAD Morse CNP on 9/23/2021 at 9:57 PM    An electronic signature was used to authenticate this note.         LDL Cholesterol (mg/dL)   Date Value   07/23/2018 122   10/15/2015 121       (goal LDL is <100)   AST (U/L)   Date Value   05/31/2016 22     ALT (U/L)   Date Value   05/31/2016 26     BUN (mg/dL)   Date Value   09/15/2021 20     BP Readings from Last 3 Encounters:   09/13/21 118/84   07/19/21 (!) 146/78   07/18/21 (!) 161/85          (goal 120/80)    Past Medical History:   Diagnosis Date    Bradycardia     Hypertension     Kidney stones     kidney stones x 2 episodes      Past Surgical History:   Procedure Laterality Date    CYSTOSCOPY  07/09/2021    with stent insertion    CYSTOSCOPY Left 7/15/2021    CYSTOSCOPY URETEROSCOPY HOLMIUM LASER WITH STENT CHANGE performed by Naomi Ronquillo MD at [address] Bilateral 2010    cataracts    KIDNEY STONE REMOVAL      VASECTOMY         Family History   Problem Relation Age of Onset    High Blood Pressure Mother     Cancer Father        Social History     Tobacco Use    Smoking status: Never Smoker    Smokeless tobacco: Never Used   Substance Use Topics    Alcohol use: No     Alcohol/week: 0.0 standard drinks     Comment: if any, social occasional      Current Outpatient Medications   Medication Sig Dispense Refill    benzonatate (TESSALON) 200 MG capsule Take 1 capsule by mouth 3 times daily as needed for Cough 30 capsule 0    amoxicillin-clavulanate (AUGMENTIN) 875-125 MG per tablet Take 1 tablet by mouth 2 times daily for 10 days 20 tablet 0    acetaminophen (TYLENOL) 500 MG tablet Take 500 mg by mouth every 6 hours as needed for Pain      atenolol (TENORMIN) 25 MG tablet Take 1 tablet by mouth 2 times daily 180 tablet 0    amLODIPine (NORVASC) 5 MG tablet Take 1 tablet by mouth daily 30 tablet 0     No current facility-administered medications for this visit. No Known Allergies    Health Maintenance   Topic Date Due    Hepatitis C screen  Never done    COVID-19 Vaccine (1) Never done    DTaP/Tdap/Td vaccine (1 - Tdap) Never done    Diabetes screen  Never done    Colon cancer screen colonoscopy  Never done    Shingles Vaccine (1 of 2) Never done    Pneumococcal 65+ years Vaccine (1 of 1 - PPSV23) Never done    Flu vaccine (1) Never done    Lipid screen  07/23/2023    Hepatitis A vaccine  Aged Out    Hepatitis B vaccine  Aged Out    Hib vaccine  Aged Out    Meningococcal (ACWY) vaccine  Aged Out       Subjective:      Review of Systems   Constitutional: Positive for fatigue and fever. HENT: Negative for congestion, ear pain and mouth sores. Respiratory: Positive for cough. Cardiovascular: Negative for chest pain, palpitations and leg swelling. Gastrointestinal: Negative for constipation, diarrhea and nausea. Genitourinary: Negative for difficulty urinating and dysuria. Musculoskeletal: Negative for back pain. Skin: Negative for rash and wound. Neurological: Negative for dizziness, light-headedness and headaches. Psychiatric/Behavioral: Positive for sleep disturbance. Negative for dysphoric mood. The patient is not nervous/anxious. Objective: There were no vitals taken for this visit. Physical Exam  Constitutional:       Appearance: Normal appearance. HENT:      Head: Normocephalic and atraumatic. Neurological:      Mental Status: He is alert. Psychiatric:         Mood and Affect: Mood normal.         Behavior: Behavior normal.         Assessment/Plan:   1. Abnormal CXR  2. Cough  -     benzonatate (TESSALON) 200 MG capsule;  Take 1 capsule by mouth 3 times daily as needed for Cough, Disp-30 capsule, R-0Normal     Complete follow up CXR  Complete Augmentin    No follow-ups on file.    No orders of the defined types were placed in this encounter. Orders Placed This Encounter   Medications    benzonatate (TESSALON) 200 MG capsule     Sig: Take 1 capsule by mouth 3 times daily as needed for Cough     Dispense:  30 capsule     Refill:  0       Patient given educational materials - see patient instructions. Discussed use, benefit, and side effects of prescribed medications. All patient questions answered. Pt voiced understanding. Reviewed health maintenance. Instructed to continue current medications, diet and exercise. Patient agreed with treatment plan. Follow up as directed.      Electronically signed by SOLEDAD Melissa CNP on 9/23/2021 at 4:48 PM

## 2021-09-24 ENCOUNTER — HOSPITAL ENCOUNTER (OUTPATIENT)
Age: 71
Discharge: HOME | End: 2021-09-26
Payer: MEDICARE

## 2021-09-24 ENCOUNTER — HOSPITAL ENCOUNTER (OUTPATIENT)
Dept: GENERAL RADIOLOGY | Age: 71
Discharge: HOME | End: 2021-09-26
Payer: MEDICARE

## 2021-09-24 DIAGNOSIS — R93.89 ABNORMAL CXR: Primary | ICD-10-CM

## 2021-09-24 DIAGNOSIS — R50.9 FEVER, UNSPECIFIED FEVER CAUSE: ICD-10-CM

## 2021-09-24 DIAGNOSIS — R93.89 ABNORMAL CXR: ICD-10-CM

## 2021-09-24 PROCEDURE — 71046 X-RAY EXAM CHEST 2 VIEWS: CPT

## 2021-09-24 RX ORDER — LEVOFLOXACIN 500 MG/1
500 TABLET, FILM COATED ORAL DAILY
Qty: 10 TABLET | Refills: 0 | Status: SHIPPED | OUTPATIENT
Start: 2021-09-24 | End: 2021-10-04

## 2021-11-01 ENCOUNTER — OFFICE VISIT (OUTPATIENT)
Dept: PRIMARY CARE CLINIC | Age: 71
End: 2021-11-01
Payer: MEDICARE

## 2021-11-01 ENCOUNTER — NURSE TRIAGE (OUTPATIENT)
Dept: OTHER | Facility: CLINIC | Age: 71
End: 2021-11-01

## 2021-11-01 VITALS
DIASTOLIC BLOOD PRESSURE: 80 MMHG | TEMPERATURE: 97.6 F | WEIGHT: 234.9 LBS | BODY MASS INDEX: 33.7 KG/M2 | HEART RATE: 61 BPM | SYSTOLIC BLOOD PRESSURE: 122 MMHG | OXYGEN SATURATION: 98 %

## 2021-11-01 DIAGNOSIS — R10.9 FLANK PAIN: ICD-10-CM

## 2021-11-01 DIAGNOSIS — R10.84 GENERALIZED ABDOMINAL PAIN: ICD-10-CM

## 2021-11-01 DIAGNOSIS — N20.0 KIDNEY STONE: ICD-10-CM

## 2021-11-01 DIAGNOSIS — R05.9 COUGH: Primary | ICD-10-CM

## 2021-11-01 PROCEDURE — 99214 OFFICE O/P EST MOD 30 MIN: CPT | Performed by: NURSE PRACTITIONER

## 2021-11-01 PROCEDURE — G8427 DOCREV CUR MEDS BY ELIG CLIN: HCPCS | Performed by: NURSE PRACTITIONER

## 2021-11-01 PROCEDURE — 1123F ACP DISCUSS/DSCN MKR DOCD: CPT | Performed by: NURSE PRACTITIONER

## 2021-11-01 PROCEDURE — 3017F COLORECTAL CA SCREEN DOC REV: CPT | Performed by: NURSE PRACTITIONER

## 2021-11-01 PROCEDURE — 1036F TOBACCO NON-USER: CPT | Performed by: NURSE PRACTITIONER

## 2021-11-01 PROCEDURE — 4040F PNEUMOC VAC/ADMIN/RCVD: CPT | Performed by: NURSE PRACTITIONER

## 2021-11-01 PROCEDURE — G8417 CALC BMI ABV UP PARAM F/U: HCPCS | Performed by: NURSE PRACTITIONER

## 2021-11-01 PROCEDURE — G8484 FLU IMMUNIZE NO ADMIN: HCPCS | Performed by: NURSE PRACTITIONER

## 2021-11-01 ASSESSMENT — ENCOUNTER SYMPTOMS
COUGH: 1
EYE PAIN: 0
NAUSEA: 0
SHORTNESS OF BREATH: 0
EYE REDNESS: 0
DIARRHEA: 0
BACK PAIN: 1
CONSTIPATION: 0

## 2021-11-01 NOTE — PROGRESS NOTES
[address] [facility] PR[facility]  [address]  [facility] [address] [place] [address]  Dept: [phone]    Ramirez Haynes is a 70 y.o. male Established patient, who presents today for his medical conditions/complaints as noted below. Chief Complaint   Patient presents with    Other     right sided rib pain - pt denies injury. HPI:     HPI  States he has had low grade fever that did not show up with thermometer today. He has pain in right rib for 2 weeks  Still has cough but it is reduced. Pain is right at bottom of rib cage in back and then extends around to front. It does not feel like normal muscle pain. He thinks it may be his gallbladder. He has had stones in gallbladder since 2016. He has not completed follow up x-ray. CT of abdomen showed gallstones.     Reviewed prior notes None  Reviewed previous Imaging CT    LDL Cholesterol (mg/dL)   Date Value   07/23/2018 122   10/15/2015 121       (goal LDL is <100)   AST (U/L)   Date Value   05/31/2016 22     ALT (U/L)   Date Value   05/31/2016 26     BUN (mg/dL)   Date Value   09/15/2021 20     BP Readings from Last 3 Encounters:   11/01/21 122/80   09/13/21 118/84   07/19/21 (!) 146/78          (goal 120/80)    Past Medical History:   Diagnosis Date    Bradycardia     Hypertension     Kidney stones     kidney stones x 2 episodes      Past Surgical History:   Procedure Laterality Date    CYSTOSCOPY  07/09/2021    with stent insertion    CYSTOSCOPY Left 7/15/2021    CYSTOSCOPY URETEROSCOPY HOLMIUM LASER WITH STENT CHANGE performed by Prabhu Tompkins MD at [address] Bilateral 2010    cataracts    KIDNEY STONE REMOVAL      VASECTOMY         Family History   Problem Relation Age of Onset    High Blood Pressure Mother     Cancer Father        Social History     Tobacco Use    Smoking status: Never Smoker    Smokeless tobacco: Never Used   Substance Use Topics    Alcohol use: No     Alcohol/week: 0.0 standard drinks     Comment: if any, social occasional      Current Outpatient Medications   Medication Sig Dispense Refill    acetaminophen (TYLENOL) 500 MG tablet Take 500 mg by mouth every 6 hours as needed for Pain      atenolol (TENORMIN) 25 MG tablet Take 1 tablet by mouth 2 times daily 180 tablet 0    amLODIPine (NORVASC) 5 MG tablet Take 1 tablet by mouth daily 30 tablet 0     No current facility-administered medications for this visit. No Known Allergies    Health Maintenance   Topic Date Due    Hepatitis C screen  Never done    COVID-19 Vaccine (1) Never done    DTaP/Tdap/Td vaccine (1 - Tdap) Never done    Colon cancer screen colonoscopy  Never done    Shingles Vaccine (1 of 2) Never done    Pneumococcal 65+ years Vaccine (1 of 1 - PPSV23) Never done    Flu vaccine (1) Never done    Lipid screen  07/23/2023    Hepatitis A vaccine  Aged Out    Hepatitis B vaccine  Aged Out    Hib vaccine  Aged Out    Meningococcal (ACWY) vaccine  Aged Out       Subjective:      Review of Systems   Constitutional: Positive for fatigue and fever. Negative for chills. HENT: Negative for congestion and ear pain. Eyes: Negative for pain and redness. Respiratory: Positive for cough. Negative for shortness of breath. Cardiovascular: Negative for chest pain, palpitations and leg swelling. Gastrointestinal: Negative for constipation, diarrhea and nausea. Genitourinary: Negative for difficulty urinating and dysuria. Musculoskeletal: Positive for back pain. Skin: Negative for rash and wound. Psychiatric/Behavioral: Positive for sleep disturbance. Negative for dysphoric mood. The patient is not nervous/anxious. Objective:     /80   Pulse 61   Temp 97.6 °F (36.4 °C)   Wt 234 lb 14.4 oz (106.5 kg)   SpO2 98%   BMI 33.70 kg/m²   Physical Exam  Vitals and nursing note reviewed. Constitutional:       Appearance: Normal appearance. HENT:      Head: Normocephalic.       Right Ear: External ear normal.      Left Ear: External ear normal.   Eyes:      Conjunctiva/sclera: Conjunctivae normal.      Pupils: Pupils are equal, round, and reactive to light. Cardiovascular:      Rate and Rhythm: Normal rate and regular rhythm. Heart sounds: Normal heart sounds. Pulmonary:      Effort: Pulmonary effort is normal.      Breath sounds: Normal breath sounds. Abdominal:      Palpations: Abdomen is soft. Musculoskeletal:      Cervical back: Normal range of motion. Thoracic back: Tenderness present. Lumbar back: Tenderness present. Skin:     General: Skin is warm and dry. Neurological:      Mental Status: He is alert and oriented to person, place, and time. Motor: No weakness. Psychiatric:         Mood and Affect: Mood normal.         Thought Content: Thought content normal.         Assessment/Plan:   1. Cough  -     CBC With Auto Differential; Future  -     Comprehensive Metabolic Panel; Future  2. Flank pain  -     CT ABDOMEN PELVIS W WO CONTRAST Additional Contrast? Radiologist Recommendation; Future  -     CBC With Auto Differential; Future  -     Comprehensive Metabolic Panel; Future  3. Kidney stone  -     CT ABDOMEN PELVIS W WO CONTRAST Additional Contrast? Radiologist Recommendation; Future  4. Generalized abdominal pain     Complete chest x-ray  Complete CT of abdomen and pelvis  Complete lab work - CBC and CMP    Return in about 2 weeks (around 11/15/2021) for pain. low grade fever.     Orders Placed This Encounter   Procedures    CT ABDOMEN PELVIS W WO CONTRAST Additional Contrast? Radiologist Recommendation     Standing Status:   Future     Standing Expiration Date:   11/1/2022     Order Specific Question:   Additional Contrast?     Answer:   Radiologist Recommendation     Order Specific Question:   Reason for exam:     Answer:   abdominal pain    CBC With Auto Differential     Standing Status:   Future     Standing Expiration Date:   11/1/2022    Comprehensive

## 2021-11-01 NOTE — TELEPHONE ENCOUNTER
Received call from Salima Mendosa at [facility] with [facility] Brief description of triage: pain on right side. Fever. Diagnosed with collapsed lung last month. Symptoms     Triage indicates for patient to see today in office. Patient had questions regarding x-ray scheduled for today, as well. Connected to [facility] with MD office to discuss further questions and have NP call patient back to schedule/ discuss. Care advice provided, patient verbalizes understanding; denies any other questions or concerns; instructed to call back for any new or worsening symptoms. Attention Provider: Thank you for allowing me to participate in the care of your patient. The patient was connected to triage in response to information provided to the [facility]. Please do not respond through this encounter as the response is not directed to a shared pool. Reason for Disposition   SEVERE back pain (e.g., excruciating, unable to do any normal activities) and not improved after pain medicine and CARE ADVICE    Answer Assessment - Initial Assessment Questions  1. ONSET: \"When did the pain begin? \"       Month ago, but was better and now pain is back     2. LOCATION: \"Where does it hurt? \" (upper, mid or lower back)      Right side/ back     3. SEVERITY: \"How bad is the pain? \"  (e.g., Scale 1-10; mild, moderate, or severe)    - MILD (1-3): doesn't interfere with normal activities     - MODERATE (4-7): interferes with normal activities or awakens from sleep     - SEVERE (8-10): excruciating pain, unable to do any normal activities       8/10 without medication     4. PATTERN: \"Is the pain constant? \" (e.g., yes, no; constant, intermittent)       Intermittent, worse with certain movements     5. RADIATION: \"Does the pain shoot into your legs or elsewhere? \"      Denies     6. CAUSE:  \"What do you think is causing the back pain? \"      Lung collapse, kidney stone or gallstone    7.  BACK OVERUSE:  Shayne Russo recent lifting of heavy

## 2021-11-02 ENCOUNTER — HOSPITAL ENCOUNTER (OUTPATIENT)
Dept: GENERAL RADIOLOGY | Age: 71
Discharge: HOME | End: 2021-11-04
Payer: MEDICARE

## 2021-11-02 ENCOUNTER — HOSPITAL ENCOUNTER (OUTPATIENT)
Age: 71
Discharge: HOME | End: 2021-11-04
Payer: MEDICARE

## 2021-11-02 ENCOUNTER — HOSPITAL ENCOUNTER (OUTPATIENT)
Age: 71
Discharge: HOME | End: 2021-11-02
Payer: MEDICARE

## 2021-11-02 ENCOUNTER — HOSPITAL ENCOUNTER (OUTPATIENT)
Dept: CT IMAGING | Age: 71
Discharge: HOME | End: 2021-11-04
Payer: MEDICARE

## 2021-11-02 DIAGNOSIS — R93.89 ABNORMAL CXR: ICD-10-CM

## 2021-11-02 DIAGNOSIS — N20.0 STONE, KIDNEY: ICD-10-CM

## 2021-11-02 DIAGNOSIS — R10.9 FLANK PAIN: Primary | ICD-10-CM

## 2021-11-02 DIAGNOSIS — R93.5 ABNORMAL CT OF THE ABDOMEN: Primary | ICD-10-CM

## 2021-11-02 DIAGNOSIS — R10.9 FLANK PAIN: ICD-10-CM

## 2021-11-02 DIAGNOSIS — R05.9 COUGH: ICD-10-CM

## 2021-11-02 DIAGNOSIS — N20.0 KIDNEY STONE: ICD-10-CM

## 2021-11-02 LAB
ABSOLUTE EOS #: 0.1 K/UL (ref 0–0.4)
ABSOLUTE IMMATURE GRANULOCYTE: ABNORMAL K/UL (ref 0–0.3)
ABSOLUTE LYMPH #: 1.3 K/UL (ref 1–4.8)
ABSOLUTE MONO #: 0.6 K/UL (ref 0.1–1.3)
ALBUMIN SERPL-MCNC: 4.4 G/DL (ref 3.5–5.2)
ALBUMIN/GLOBULIN RATIO: ABNORMAL (ref 1–2.5)
ALP BLD-CCNC: 105 U/L (ref 40–129)
ALT SERPL-CCNC: 15 U/L (ref 5–41)
ANION GAP SERPL CALCULATED.3IONS-SCNC: 11 MMOL/L (ref 9–17)
AST SERPL-CCNC: 15 U/L
BASOPHILS # BLD: 1 % (ref 0–2)
BASOPHILS ABSOLUTE: 0 K/UL (ref 0–0.2)
BILIRUB SERPL-MCNC: 0.37 MG/DL (ref 0.3–1.2)
BUN BLDV-MCNC: 15 MG/DL (ref 8–23)
BUN/CREAT BLD: ABNORMAL (ref 9–20)
CALCIUM SERPL-MCNC: 9 MG/DL (ref 8.6–10.4)
CHLORIDE BLD-SCNC: 105 MMOL/L (ref 98–107)
CO2: 25 MMOL/L (ref 20–31)
CREAT SERPL-MCNC: 0.87 MG/DL (ref 0.7–1.2)
DIFFERENTIAL TYPE: ABNORMAL
EOSINOPHILS RELATIVE PERCENT: 2 % (ref 0–4)
GFR AFRICAN AMERICAN: >60 ML/MIN
GFR NON-AFRICAN AMERICAN: >60 ML/MIN
GFR SERPL CREATININE-BSD FRML MDRD: ABNORMAL ML/MIN/{1.73_M2}
GFR SERPL CREATININE-BSD FRML MDRD: ABNORMAL ML/MIN/{1.73_M2}
GLUCOSE BLD-MCNC: 123 MG/DL (ref 70–99)
HCT VFR BLD CALC: 38.9 % (ref 41–53)
HEMOGLOBIN: 13.4 G/DL (ref 13.5–17.5)
IMMATURE GRANULOCYTES: ABNORMAL %
LYMPHOCYTES # BLD: 21 % (ref 24–44)
MCH RBC QN AUTO: 31.7 PG (ref 26–34)
MCHC RBC AUTO-ENTMCNC: 34.6 G/DL (ref 31–37)
MCV RBC AUTO: 91.8 FL (ref 80–100)
MONOCYTES # BLD: 10 % (ref 1–7)
NRBC AUTOMATED: ABNORMAL PER 100 WBC
PDW BLD-RTO: 14.4 % (ref 11.5–14.9)
PLATELET # BLD: 203 K/UL (ref 150–450)
PLATELET ESTIMATE: ABNORMAL
PMV BLD AUTO: 8.3 FL (ref 6–12)
POTASSIUM SERPL-SCNC: 4 MMOL/L (ref 3.7–5.3)
RBC # BLD: 4.23 M/UL (ref 4.5–5.9)
RBC # BLD: ABNORMAL 10*6/UL
SEG NEUTROPHILS: 66 % (ref 36–66)
SEGMENTED NEUTROPHILS ABSOLUTE COUNT: 4.3 K/UL (ref 1.3–9.1)
SODIUM BLD-SCNC: 141 MMOL/L (ref 135–144)
TOTAL PROTEIN: 7.2 G/DL (ref 6.4–8.3)
WBC # BLD: 6.3 K/UL (ref 3.5–11)
WBC # BLD: ABNORMAL 10*3/UL

## 2021-11-02 PROCEDURE — 74176 CT ABD & PELVIS W/O CONTRAST: CPT

## 2021-11-02 PROCEDURE — 85025 COMPLETE CBC W/AUTO DIFF WBC: CPT

## 2021-11-02 PROCEDURE — 36415 COLL VENOUS BLD VENIPUNCTURE: CPT

## 2021-11-02 PROCEDURE — 71046 X-RAY EXAM CHEST 2 VIEWS: CPT

## 2021-11-02 PROCEDURE — 80053 COMPREHEN METABOLIC PANEL: CPT

## 2021-11-02 NOTE — RESULT ENCOUNTER NOTE
Call and advise patient that the results showed abnormal soft tissue in mediastinum which has been evaluated in the past. The CT that devyn ordered has a better description of lesion.

## 2021-11-03 ENCOUNTER — TELEPHONE (OUTPATIENT)
Dept: UROLOGY | Age: 71
End: 2021-11-03

## 2021-11-03 ENCOUNTER — TELEPHONE (OUTPATIENT)
Dept: PRIMARY CARE CLINIC | Age: 71
End: 2021-11-03

## 2021-11-03 DIAGNOSIS — R93.5 ABNORMAL CT OF THE ABDOMEN: Primary | ICD-10-CM

## 2021-11-03 DIAGNOSIS — R10.9 FLANK PAIN: ICD-10-CM

## 2021-11-03 NOTE — PROGRESS NOTES
There was abnormality T9 and at L3 and that need further evaluated. MRI with contrast ordered. There is minimal pleural effusion on right. There is evidence of gallbladder stones but no inflammation or it causing trouble. Also noted diverticulosis.  Follow up after MRI

## 2021-11-08 DIAGNOSIS — I10 ESSENTIAL HYPERTENSION: ICD-10-CM

## 2021-11-09 RX ORDER — ATENOLOL 25 MG/1
25 TABLET ORAL 2 TIMES DAILY
Qty: 180 TABLET | Refills: 3 | Status: SHIPPED | OUTPATIENT
Start: 2021-11-09 | End: 2022-10-24

## 2021-11-09 RX ORDER — AMLODIPINE BESYLATE 5 MG/1
5 TABLET ORAL DAILY
Qty: 90 TABLET | Refills: 3 | Status: SHIPPED | OUTPATIENT
Start: 2021-11-09 | End: 2022-10-24

## 2021-11-10 NOTE — TELEPHONE ENCOUNTER
Called patient to schedule an appointment. Patient states that he has a lot of other testing and things going on right now and would like to call back when he ready and able to schedule.

## 2021-11-16 ENCOUNTER — TELEPHONE (OUTPATIENT)
Dept: PRIMARY CARE CLINIC | Age: 71
End: 2021-11-16

## 2021-11-16 ENCOUNTER — HOSPITAL ENCOUNTER (OUTPATIENT)
Dept: MRI IMAGING | Age: 71
Discharge: HOME | End: 2021-11-18
Payer: MEDICARE

## 2021-11-16 DIAGNOSIS — R93.5 ABNORMAL CT OF THE ABDOMEN: ICD-10-CM

## 2021-11-16 DIAGNOSIS — R10.9 FLANK PAIN: ICD-10-CM

## 2021-11-16 DIAGNOSIS — M46.24 OSTEOMYELITIS OF THORACIC VERTEBRA (HCC): Primary | ICD-10-CM

## 2021-11-16 PROCEDURE — A9579 GAD-BASE MR CONTRAST NOS,1ML: HCPCS | Performed by: NURSE PRACTITIONER

## 2021-11-16 PROCEDURE — 2580000003 HC RX 258: Performed by: NURSE PRACTITIONER

## 2021-11-16 PROCEDURE — 72157 MRI CHEST SPINE W/O & W/DYE: CPT

## 2021-11-16 PROCEDURE — 72158 MRI LUMBAR SPINE W/O & W/DYE: CPT

## 2021-11-16 PROCEDURE — 6360000004 HC RX CONTRAST MEDICATION: Performed by: NURSE PRACTITIONER

## 2021-11-16 RX ORDER — DOXYCYCLINE HYCLATE 100 MG
100 TABLET ORAL 2 TIMES DAILY
Qty: 60 TABLET | Refills: 0 | Status: SHIPPED
Start: 2021-11-16 | End: 2021-12-07 | Stop reason: ALTCHOICE

## 2021-11-16 RX ORDER — SODIUM CHLORIDE 0.9 % (FLUSH) 0.9 %
10 SYRINGE (ML) INJECTION PRN
Status: DISCONTINUED | OUTPATIENT
Start: 2021-11-16 | End: 2021-11-19 | Stop reason: HOSPADM

## 2021-11-16 RX ORDER — GREEN TEA/HOODIA GORDONII 315-12.5MG
1 CAPSULE ORAL 2 TIMES DAILY
Qty: 60 TABLET | Refills: 2 | Status: SHIPPED
Start: 2021-11-16 | End: 2021-11-19 | Stop reason: ALTCHOICE

## 2021-11-16 RX ADMIN — GADOTERIDOL 20 ML: 279.3 INJECTION, SOLUTION INTRAVENOUS at 14:50

## 2021-11-16 RX ADMIN — SODIUM CHLORIDE, PRESERVATIVE FREE 10 ML: 5 INJECTION INTRAVENOUS at 14:50

## 2021-11-17 ENCOUNTER — HOSPITAL ENCOUNTER (OUTPATIENT)
Age: 71
Discharge: HOME | End: 2021-11-17
Payer: MEDICARE

## 2021-11-17 DIAGNOSIS — M46.24 OSTEOMYELITIS OF THORACIC VERTEBRA (HCC): Primary | ICD-10-CM

## 2021-11-17 DIAGNOSIS — M46.24 OSTEOMYELITIS OF THORACIC VERTEBRA (HCC): ICD-10-CM

## 2021-11-17 LAB
ABSOLUTE EOS #: 0.1 K/UL (ref 0–0.4)
ABSOLUTE IMMATURE GRANULOCYTE: ABNORMAL K/UL (ref 0–0.3)
ABSOLUTE LYMPH #: 1.2 K/UL (ref 1–4.8)
ABSOLUTE MONO #: 0.4 K/UL (ref 0.1–1.3)
BASOPHILS # BLD: 1 % (ref 0–2)
BASOPHILS ABSOLUTE: 0 K/UL (ref 0–0.2)
C-REACTIVE PROTEIN: 22 MG/L (ref 0–5)
DIFFERENTIAL TYPE: ABNORMAL
EOSINOPHILS RELATIVE PERCENT: 2 % (ref 0–4)
HCT VFR BLD CALC: 36.2 % (ref 41–53)
HEMOGLOBIN: 12.8 G/DL (ref 13.5–17.5)
IMMATURE GRANULOCYTES: ABNORMAL %
LYMPHOCYTES # BLD: 24 % (ref 24–44)
MCH RBC QN AUTO: 32 PG (ref 26–34)
MCHC RBC AUTO-ENTMCNC: 35.4 G/DL (ref 31–37)
MCV RBC AUTO: 90.4 FL (ref 80–100)
MONOCYTES # BLD: 9 % (ref 1–7)
NRBC AUTOMATED: ABNORMAL PER 100 WBC
PDW BLD-RTO: 13.7 % (ref 11.5–14.9)
PLATELET # BLD: 211 K/UL (ref 150–450)
PLATELET ESTIMATE: ABNORMAL
PMV BLD AUTO: 7.6 FL (ref 6–12)
RBC # BLD: 4 M/UL (ref 4.5–5.9)
RBC # BLD: ABNORMAL 10*6/UL
SEDIMENTATION RATE, ERYTHROCYTE: 6 MM (ref 0–20)
SEG NEUTROPHILS: 64 % (ref 36–66)
SEGMENTED NEUTROPHILS ABSOLUTE COUNT: 3.2 K/UL (ref 1.3–9.1)
WBC # BLD: 4.9 K/UL (ref 3.5–11)
WBC # BLD: ABNORMAL 10*3/UL

## 2021-11-17 PROCEDURE — 85025 COMPLETE CBC W/AUTO DIFF WBC: CPT

## 2021-11-17 PROCEDURE — 85652 RBC SED RATE AUTOMATED: CPT

## 2021-11-17 PROCEDURE — 36415 COLL VENOUS BLD VENIPUNCTURE: CPT

## 2021-11-17 PROCEDURE — 86140 C-REACTIVE PROTEIN: CPT

## 2021-11-17 NOTE — PROGRESS NOTES
CRP is elevated which we expected with the osteomyelitis. Sed rate is normal and white blood cell count is normal.  We will continue to monitor the these throughout your treatment for the osteomyelitis. Repeat labs in 2 weeks unless instructed otherwise by infectious disease.

## 2021-11-17 NOTE — TELEPHONE ENCOUNTER
Osteomyelitis at T9-T10. Will start on doxycycline 100 mg 2x/day X 30 days. Also a referral to Dr. Saba Paulino, infectious disease ,and  Dr. Elia Alcantar, spine specialist. Also sent in probiotic. I called patient and he states he is no longer having pain on right side but now he is having pain on left side. State some times he has throbbing pain on left side.  He wonders if has diverticulitis. He is taking ibuprofen 600 mg 2x/day. He is going to dry a soft diet for a few days. He also has several areas of stenosis and maria del rosario herniation. He voiced understanding of the plan.

## 2021-11-19 ENCOUNTER — TELEPHONE (OUTPATIENT)
Dept: PRIMARY CARE CLINIC | Age: 71
End: 2021-11-19

## 2021-11-19 ENCOUNTER — TELEPHONE (OUTPATIENT)
Dept: ORTHOPEDIC SURGERY | Age: 71
End: 2021-11-19

## 2021-11-19 DIAGNOSIS — M46.24 OSTEOMYELITIS OF THORACIC VERTEBRA (HCC): Primary | ICD-10-CM

## 2021-11-19 RX ORDER — AMINO ACIDS/PROTEIN HYDROLYS 15G-100/30
1 LIQUID (ML) ORAL 2 TIMES DAILY
Qty: 60 CAPSULE | Refills: 3 | Status: SHIPPED | OUTPATIENT
Start: 2021-11-19

## 2021-11-19 NOTE — TELEPHONE ENCOUNTER
Patient has been referred to Dr Schneider Prior for Osteomyelitis of thoracic vertebra     He has scheduled a New patient appt for 11/23.  However he is worried that the infection will get worse in the meantime    He is asking for a return call to discuss this (his pcp did start him on a 30 day antibiotic)  Thank you

## 2021-11-19 NOTE — TELEPHONE ENCOUNTER
Pt called stating that he was unable to  his probiotic. The pharmacy stated it needed a strength, please send over a new script to [facility] in [place]. Admission

## 2021-11-19 NOTE — TELEPHONE ENCOUNTER
[facility] for pt notifying him that Dr. Alesia Claros is not in office on Monday the 22nd so the 23rd is the first available day that he would be able to be seen. He was advised to call the office with any additional questions.

## 2021-11-23 ENCOUNTER — OFFICE VISIT (OUTPATIENT)
Dept: ORTHOPEDIC SURGERY | Age: 71
End: 2021-11-23
Payer: MEDICARE

## 2021-11-23 VITALS — RESPIRATION RATE: 16 BRPM | WEIGHT: 234 LBS | BODY MASS INDEX: 33.5 KG/M2 | HEIGHT: 70 IN

## 2021-11-23 DIAGNOSIS — M46.24 OSTEOMYELITIS OF THORACIC VERTEBRA (HCC): Primary | ICD-10-CM

## 2021-11-23 DIAGNOSIS — M54.9 BACK PAIN, UNSPECIFIED BACK LOCATION, UNSPECIFIED BACK PAIN LATERALITY, UNSPECIFIED CHRONICITY: ICD-10-CM

## 2021-11-23 PROCEDURE — G8417 CALC BMI ABV UP PARAM F/U: HCPCS | Performed by: ORTHOPAEDIC SURGERY

## 2021-11-23 PROCEDURE — G8484 FLU IMMUNIZE NO ADMIN: HCPCS | Performed by: ORTHOPAEDIC SURGERY

## 2021-11-23 PROCEDURE — 1123F ACP DISCUSS/DSCN MKR DOCD: CPT | Performed by: ORTHOPAEDIC SURGERY

## 2021-11-23 PROCEDURE — 99204 OFFICE O/P NEW MOD 45 MIN: CPT | Performed by: ORTHOPAEDIC SURGERY

## 2021-11-23 PROCEDURE — 1036F TOBACCO NON-USER: CPT | Performed by: ORTHOPAEDIC SURGERY

## 2021-11-23 PROCEDURE — 4040F PNEUMOC VAC/ADMIN/RCVD: CPT | Performed by: ORTHOPAEDIC SURGERY

## 2021-11-23 PROCEDURE — G8427 DOCREV CUR MEDS BY ELIG CLIN: HCPCS | Performed by: ORTHOPAEDIC SURGERY

## 2021-11-23 PROCEDURE — 3017F COLORECTAL CA SCREEN DOC REV: CPT | Performed by: ORTHOPAEDIC SURGERY

## 2021-11-23 RX ORDER — CEPHALEXIN 500 MG/1
500 CAPSULE ORAL 4 TIMES DAILY
Qty: 112 CAPSULE | Refills: 1 | Status: SHIPPED | OUTPATIENT
Start: 2021-11-23

## 2021-11-23 RX ORDER — SULFAMETHOXAZOLE AND TRIMETHOPRIM 800; 160 MG/1; MG/1
1 TABLET ORAL 2 TIMES DAILY
Qty: 56 TABLET | Refills: 0 | Status: SHIPPED | OUTPATIENT
Start: 2021-11-23

## 2021-11-23 NOTE — PROGRESS NOTES
Patient ID: Daphene Litten is a 70 y.o. male    Chief Compliant:  No chief complaint on file. Diagnostic imaging:    [facility]el[facility] discitis T9-10    Assessment and Plan:  1. Osteomyelitis of thoracic vertebra (HCC)    2. Back pain, unspecified back location, unspecified back pain laterality, unspecified chronicity      Sed rate 6, CRP 22    Reorder Sed rate, CRP and CBC    IR biopsy    Patient scheduled to see infectious disease on December 8th     We will switch his antibiotic from doxycycline to Bactrim and Keflex    Follow up 2 weeks    HPI:  This is a 70 y.o. male who presents to the clinic today as a new patient for osteomylelitis evaluation. Patient reports he has been overall improving. He is currently on oral Doxycycline, started about 1 week ago. He is taking ibuprofen which is controlling his pain well. No hx of falls    MRI was performed a week ago and patient has been on doxycycline since that time    Review of Systems   All other systems reviewed and are negative.       Past History:    Current Outpatient Medications:     Probiotic Product (DIFF-STAT) CAPS, Take 1 capsule by mouth 2 times daily, Disp: 60 capsule, Rfl: 3    doxycycline hyclate (VIBRA-TABS) 100 MG tablet, Take 1 tablet by mouth 2 times daily, Disp: 60 tablet, Rfl: 0    atenolol (TENORMIN) 25 MG tablet, Take 1 tablet by mouth 2 times daily, Disp: 180 tablet, Rfl: 3    amLODIPine (NORVASC) 5 MG tablet, Take 1 tablet by mouth daily, Disp: 90 tablet, Rfl: 3    acetaminophen (TYLENOL) 500 MG tablet, Take 500 mg by mouth every 6 hours as needed for Pain, Disp: , Rfl:   No Known Allergies  Social History     Socioeconomic History    Marital status:      Spouse name: Not on file    Number of children: Not on file    Years of education: Not on file    Highest education level: Not on file   Occupational History    Not on file   Tobacco Use    Smoking status: Never Smoker    Smokeless tobacco: Never Used   Vaping Use    Vaping Use: Never used   Substance and Sexual Activity    Alcohol use: No     Alcohol/week: 0.0 standard drinks     Comment: if any, social occasional    Drug use: No    Sexual activity: Yes     Partners: Female   Other Topics Concern    Not on file   Social History Narrative    Not on file     Social Determinants of Health     Financial Resource Strain: Unknown    Difficulty of Paying Living Expenses: Patient refused   Food Insecurity: Unknown    Worried About Running Out of Food in the Last Year: Patient refused    [address] in the Last Year: Patient refused   Transportation Needs:     Lack of Transportation (Medical): Not on file    Lack of Transportation (Non-Medical):  Not on file   Physical Activity:     Days of Exercise per Week: Not on file    Minutes of Exercise per Session: Not on file   Stress:     Feeling of Stress : Not on file   Social Connections:     Frequency of Communication with Friends and Family: Not on file    Frequency of Social Gatherings with Friends and Family: Not on file    Attends Islam Services: Not on file    Active Member of [address] [place] [address] or Organizations: Not on file    Attends Club or Organization Meetings: Not on file    Marital Status: Not on file   Intimate Partner Violence:     Fear of Current or Ex-Partner: Not on file    Emotionally Abused: Not on file    Physically Abused: Not on file    Sexually Abused: Not on file   Housing Stability:     Unable to Pay for Housing in the Last Year: Not on file    Number of [place] in the Last Year: Not on file    Unstable Housing in the Last Year: Not on file     Past Medical History:   Diagnosis Date    Bradycardia     Hypertension     Kidney stones     kidney stones x 2 episodes     Past Surgical History:   Procedure Laterality Date    CYSTOSCOPY  07/09/2021    with stent insertion    CYSTOSCOPY Left 7/15/2021    CYSTOSCOPY URETEROSCOPY HOLMIUM LASER WITH STENT CHANGE performed by Anabela Sarkar MD at [address] [place] [address] Drive Bilateral 2010    cataracts    KIDNEY STONE REMOVAL      VASECTOMY       Family History   Problem Relation Age of Onset    High Blood Pressure Mother     Cancer Father         Physical Exam:  Vitals signs and nursing note reviewed. Constitutional:       Appearance: well-developed. HENT:      Head: Normocephalic and atraumatic. Nose: Nose normal.   Eyes:      Conjunctiva/sclera: Conjunctivae normal.   Neck:      Musculoskeletal: Normal range of motion and neck supple. Pulmonary:      Effort: Pulmonary effort is normal. No respiratory distress. Musculoskeletal:      Comments: Normal gait     Skin:     General: Skin is warm and dry. Neurological:      Mental Status: Alert and oriented to person, place, and time. Sensory: No sensory deficit. Psychiatric:         Behavior: Behavior normal.         Thought Content: Thought content normal.        Provider Attestation:  Valery King, personally performed the services described in this documentation. All medical record entries made by the scribe were at my direction and in my presence. I have reviewed the chart and discharge instructions and agree that the records reflect my personal performance and is accurate and complete. Hieu Gray MD 11/23/21     Scribe Attestation:  By signing my name below, Nishant Rojodyce, attest that this documentation has been prepared under the direction and in the presence of Dr. Trav Frausto. Electronically signed: Leroy Granados, 11/23/21     Please note that this chart was generated using voice recognition Dragon dictation software. Although every effort was made to ensure the accuracy of this automated transcription, some errors in transcription may have occurred.

## 2021-11-29 ENCOUNTER — TELEPHONE (OUTPATIENT)
Dept: PRIMARY CARE CLINIC | Age: 71
End: 2021-11-29

## 2021-11-29 DIAGNOSIS — M46.24 OSTEOMYELITIS OF THORACIC VERTEBRA (HCC): Primary | ICD-10-CM

## 2021-11-29 RX ORDER — BACITRACIN ZINC AND POLYMYXIN B SULFATE 500; 10000 [USP'U]/G; [USP'U]/G
1 OINTMENT TOPICAL 2 TIMES DAILY
Qty: 60 CAPSULE | Refills: 2 | Status: SHIPPED | OUTPATIENT
Start: 2021-11-29

## 2021-12-01 ENCOUNTER — TELEPHONE (OUTPATIENT)
Dept: INTERVENTIONAL RADIOLOGY/VASCULAR | Age: 71
End: 2021-12-01

## 2021-12-02 ENCOUNTER — PREP FOR PROCEDURE (OUTPATIENT)
Dept: GENERAL RADIOLOGY | Age: 71
End: 2021-12-02

## 2021-12-02 RX ORDER — SODIUM CHLORIDE 9 MG/ML
INJECTION, SOLUTION INTRAVENOUS CONTINUOUS
Status: CANCELLED | OUTPATIENT
Start: 2021-12-02

## 2021-12-03 ENCOUNTER — HOSPITAL ENCOUNTER (OUTPATIENT)
Age: 71
Discharge: HOME | End: 2021-12-03
Payer: MEDICARE

## 2021-12-03 ENCOUNTER — HOSPITAL ENCOUNTER (OUTPATIENT)
Dept: INTERVENTIONAL RADIOLOGY/VASCULAR | Age: 71
Discharge: HOME | End: 2021-12-05
Payer: MEDICARE

## 2021-12-03 VITALS
OXYGEN SATURATION: 96 % | BODY MASS INDEX: 32.21 KG/M2 | SYSTOLIC BLOOD PRESSURE: 125 MMHG | TEMPERATURE: 97.2 F | HEART RATE: 62 BPM | RESPIRATION RATE: 16 BRPM | DIASTOLIC BLOOD PRESSURE: 81 MMHG | WEIGHT: 225 LBS | HEIGHT: 70 IN

## 2021-12-03 DIAGNOSIS — M46.24 OSTEOMYELITIS OF THORACIC VERTEBRA (HCC): ICD-10-CM

## 2021-12-03 DIAGNOSIS — M54.9 BACK PAIN, UNSPECIFIED BACK LOCATION, UNSPECIFIED BACK PAIN LATERALITY, UNSPECIFIED CHRONICITY: ICD-10-CM

## 2021-12-03 LAB
-: NORMAL
APPEARANCE CSF: ABNORMAL
C-REACTIVE PROTEIN: 8.2 MG/L (ref 0–5)
PLATELET # BLD: 203 K/UL (ref 138–453)
RBC CSF: ABNORMAL /MM3
REASON FOR REJECTION: NORMAL
SEDIMENTATION RATE, ERYTHROCYTE: 53 MM (ref 0–20)
SUPERNAT COLOR CSF: ABNORMAL
SURGICAL PATHOLOGY REPORT: NORMAL
TUBE NUMBER CSF: 1
VOLUME CSF: ABNORMAL
WBC CSF: 894 /MM3
XANTHOCHROMIA: PRESENT
ZZ NTE CLEAN UP: ORDERED TEST: NORMAL
ZZ NTE WITH NAME CLEAN UP: SPECIMEN SOURCE: NORMAL

## 2021-12-03 PROCEDURE — 6360000002 HC RX W HCPCS: Performed by: RADIOLOGY

## 2021-12-03 PROCEDURE — 2709999900 HC NON-CHARGEABLE SUPPLY

## 2021-12-03 PROCEDURE — 85049 AUTOMATED PLATELET COUNT: CPT

## 2021-12-03 PROCEDURE — 88173 CYTOPATH EVAL FNA REPORT: CPT

## 2021-12-03 PROCEDURE — 89051 BODY FLUID CELL COUNT: CPT

## 2021-12-03 PROCEDURE — 77003 FLUOROGUIDE FOR SPINE INJECT: CPT

## 2021-12-03 PROCEDURE — 2580000003 HC RX 258: Performed by: PHYSICIAN ASSISTANT

## 2021-12-03 PROCEDURE — 7100000010 HC PHASE II RECOVERY - FIRST 15 MIN

## 2021-12-03 PROCEDURE — 99152 MOD SED SAME PHYS/QHP 5/>YRS: CPT

## 2021-12-03 PROCEDURE — 2709999900 IR PERC ASPIRATION INTERVERT DISC

## 2021-12-03 PROCEDURE — 85652 RBC SED RATE AUTOMATED: CPT

## 2021-12-03 PROCEDURE — 7100000011 HC PHASE II RECOVERY - ADDTL 15 MIN

## 2021-12-03 PROCEDURE — 86140 C-REACTIVE PROTEIN: CPT

## 2021-12-03 PROCEDURE — 62267 INTERDISCAL PERQ ASPIR DX: CPT

## 2021-12-03 PROCEDURE — 36415 COLL VENOUS BLD VENIPUNCTURE: CPT

## 2021-12-03 RX ORDER — FENTANYL CITRATE 50 UG/ML
INJECTION, SOLUTION INTRAMUSCULAR; INTRAVENOUS
Status: COMPLETED | OUTPATIENT
Start: 2021-12-03 | End: 2021-12-03

## 2021-12-03 RX ORDER — SODIUM CHLORIDE 9 MG/ML
INJECTION, SOLUTION INTRAVENOUS CONTINUOUS
Status: DISCONTINUED | OUTPATIENT
Start: 2021-12-03 | End: 2021-12-06 | Stop reason: HOSPADM

## 2021-12-03 RX ORDER — ACETAMINOPHEN 325 MG/1
650 TABLET ORAL EVERY 4 HOURS PRN
Status: DISCONTINUED | OUTPATIENT
Start: 2021-12-03 | End: 2021-12-06 | Stop reason: HOSPADM

## 2021-12-03 RX ADMIN — SODIUM CHLORIDE: 9 INJECTION, SOLUTION INTRAVENOUS at 08:40

## 2021-12-03 RX ADMIN — FENTANYL CITRATE 50 MCG: 50 INJECTION, SOLUTION INTRAMUSCULAR; INTRAVENOUS at 10:02

## 2021-12-03 ASSESSMENT — PAIN SCALES - GENERAL
PAINLEVEL_OUTOF10: 5
PAINLEVEL_OUTOF10: 0

## 2021-12-03 ASSESSMENT — PAIN DESCRIPTION - DESCRIPTORS: DESCRIPTORS: STABBING;SHOOTING

## 2021-12-03 ASSESSMENT — PAIN - FUNCTIONAL ASSESSMENT: PAIN_FUNCTIONAL_ASSESSMENT: 0-10

## 2021-12-03 NOTE — BRIEF OP NOTE
Brief Postoperative Note    Oliver Gifford  YOB: 1950  [id number]    Pre-operative Diagnosis: Discitis    Post-operative Diagnosis: Same    Procedure: Disc aspiration    Anesthesia: Local    Surgeons/Assistants: Erum Silver MD    Estimated Blood Loss: less than 50     Complications: None    Specimens: Was Obtained:     Findings: Successful aspiration of T9-T10 disc space. Scant bloody fluid was aspirated.       Electronically signed by ADDI Thomas on 12/3/2021 at 10:21 AM

## 2021-12-03 NOTE — PROGRESS NOTES
Pt comes to [facility]. Skin warm and dry. resp easy on room air. Band aid to mid back is clean, dry and intact. C/o mid back pain 5/10. Denies need for pain meds. Lunch provided.

## 2021-12-03 NOTE — H&P
History and Physical    Pt Name: Zack Carrillo  MRN: [id number]  YOB: 1950  Date of evaluation: 12/3/2021  Primary Care Physician: SOLEDAD Alamo CNP    SUBJECTIVE:   History of Chief Complaint:    Zack Carrillo is a 70 y.o. male who is scheduled today for IR T9 bone biopsy. He reports \" I have an infection at T9 of my spine\" and states he is currently treating with oral antbiotics per Dr. Mcihael Anthony but is having this done to detect what specific infection it is. He rates his pain a 4/10 and reports he actually feels as if his back pain is improving since he has been on oral antibiotic. Allergies  has No Known Allergies. Medications  Prior to Admission medications    Medication Sig Start Date End Date Taking? Authorizing Provider   cephALEXin (KEFLEX) 500 MG capsule Take 1 capsule by mouth 4 times daily Take with food. 11/23/21  Yes Moira Garcia MD   sulfamethoxazole-trimethoprim (BACTRIM DS;SEPTRA DS) 800-160 MG per tablet Take 1 tablet by mouth 2 times daily Take with food. 11/23/21  Yes Moira Garcia MD   atenolol (TENORMIN) 25 MG tablet Take 1 tablet by mouth 2 times daily 11/9/21  Yes SOLEDAD Alamo CNP   amLODIPine (NORVASC) 5 MG tablet Take 1 tablet by mouth daily 11/9/21  Yes SOLEDAD Alamo CNP   Bacillus Coagulans-Inulin (PROBIOTIC FORMULA) 1-250 BILLION-MG CAPS Take 1 capsule by mouth 2 times daily 11/29/21   SOLEDAD Alamo CNP   Probiotic Product (DIFF-STAT) CAPS Take 1 capsule by mouth 2 times daily 11/19/21   SOLEDAD Alamo CNP   doxycycline hyclate (VIBRA-TABS) 100 MG tablet Take 1 tablet by mouth 2 times daily 11/16/21 12/16/21  SOLEDAD Alamo CNP   acetaminophen (TYLENOL) 500 MG tablet Take 500 mg by mouth every 6 hours as needed for Pain    Historical Provider, MD     Past Medical History    has a past medical history of Bradycardia, Hypertension, and Kidney stones.   Past Surgical History   has a past surgical history that includes Vasectomy; Cystocopy (07/09/2021); eye surgery (Bilateral, 2010); Cystoscopy (Left, 7/15/2021); and Kidney stone removal.  Social History   reports that he has never smoked. He has never used smokeless tobacco.   reports no history of alcohol use. reports no history of drug use. Marital Status    Children 4  Occupation retired [address] [employer]    Family History  Family Status   Relation Name Status    Mother  (Not Specified)    Father  (Not Specified)     family history includes Cancer in his father; High Blood Pressure in his mother. Review of Systems:  CONSTITUTIONAL:   negative for fevers, chills, fatigue and malaise    EYES:   negative for double vision, blurred vision and photophobia    HEENT:   negative for tinnitus, epistaxis and sore throat     RESPIRATORY:   negative for cough, shortness of breath, wheezing     CARDIOVASCULAR:   negative for chest pain, palpitations, syncope, edema     GASTROINTESTINAL:   negative for nausea, vomiting     GENITOURINARY:   negative for incontinence     MUSCULOSKELETAL:   See HPI   NEUROLOGICAL:   Negative for weakness and tingling  negative for headaches and dizziness     PSYCHIATRIC:   negative for anxiety       OBJECTIVE:   VITALS:  height is 5' 10\" (1.778 m) and weight is 225 lb (102.1 kg). His temporal temperature is 97 °F (36.1 °C). His blood pressure is 145/90 (abnormal) and his pulse is 75. His respiration is 16 and oxygen saturation is 97%. CONSTITUTIONAL:alert & oriented x 3, no acute distress. Calm and pleasant. Very friendly and talkative. SKIN:  Warm and dry, no rashes on exposed areas of skin   HEAD:  Normocephalic, atraumatic   EYES: PERRL. EOMs intact. Wearing glasses. EARS:  Equal bilaterally, no edema or thickening, skin is intact without lumps or lesions. No discharge. Hearing grossly WNL. NOSE:  Nares patent.   No rhinorrhea   MOUTH/THROAT:  Mucous membranes moist, tongue is pink, uvula midline, teeth appear to be intact  NECK:supple, full ROM  LUNGS: Respirations even and non-labored. Clear to auscultation bilaterally, no wheezes, rales, or rhonchi. CARDIOVASCULAR: Regular rate and rhythm, no murmurs/rubs/gallops   ABDOMEN: soft, non-tender, non-distended, bowel sounds active x 4   EXTREMITIES: No edema bilateral lower extremities. No varicosities bilateral lower extremities. NEUROLOGIC: CN II-XII are grossly intact. Gait not assessed.    IMPRESSIONS:   Back pain  Osteomyelitis   PLANS:   IR bone biopsy T9    SOLEDAD SIMPSON CNP   Electronically signed 12/3/2021 at 9:21 AM

## 2021-12-06 LAB
BANDS, CSF: NORMAL %
BASO CSF: NORMAL %
BLAST CSF: NORMAL %
EOS CSF: NORMAL %
FLUID DIFF COMMENT: NORMAL
LYMPHS CSF: 3 %
METAYELO CSF: NORMAL %
MONO/MACROPHAGE CSF (MANUAL): NORMAL %
MYELOCYTE CSF: NORMAL %
NEUTROPHILS, CSF: 89 %
OTHER CELLS FLUID: NORMAL %

## 2021-12-07 ENCOUNTER — OFFICE VISIT (OUTPATIENT)
Dept: ORTHOPEDIC SURGERY | Age: 71
End: 2021-12-07
Payer: MEDICARE

## 2021-12-07 ENCOUNTER — TELEPHONE (OUTPATIENT)
Dept: PRIMARY CARE CLINIC | Age: 71
End: 2021-12-07

## 2021-12-07 VITALS — HEIGHT: 70 IN | RESPIRATION RATE: 14 BRPM | BODY MASS INDEX: 32.21 KG/M2 | WEIGHT: 225 LBS

## 2021-12-07 DIAGNOSIS — M46.24 OSTEOMYELITIS OF THORACIC VERTEBRA (HCC): Primary | ICD-10-CM

## 2021-12-07 PROCEDURE — G8427 DOCREV CUR MEDS BY ELIG CLIN: HCPCS | Performed by: ORTHOPAEDIC SURGERY

## 2021-12-07 PROCEDURE — 3017F COLORECTAL CA SCREEN DOC REV: CPT | Performed by: ORTHOPAEDIC SURGERY

## 2021-12-07 PROCEDURE — 4040F PNEUMOC VAC/ADMIN/RCVD: CPT | Performed by: ORTHOPAEDIC SURGERY

## 2021-12-07 PROCEDURE — G8417 CALC BMI ABV UP PARAM F/U: HCPCS | Performed by: ORTHOPAEDIC SURGERY

## 2021-12-07 PROCEDURE — G8484 FLU IMMUNIZE NO ADMIN: HCPCS | Performed by: ORTHOPAEDIC SURGERY

## 2021-12-07 PROCEDURE — 99213 OFFICE O/P EST LOW 20 MIN: CPT | Performed by: ORTHOPAEDIC SURGERY

## 2021-12-07 PROCEDURE — 1123F ACP DISCUSS/DSCN MKR DOCD: CPT | Performed by: ORTHOPAEDIC SURGERY

## 2021-12-07 PROCEDURE — 1036F TOBACCO NON-USER: CPT | Performed by: ORTHOPAEDIC SURGERY

## 2021-12-07 NOTE — TELEPHONE ENCOUNTER
Patient had biopsy  Dr. Domingo Burr changed his antibiotic to Keflex and Bactrim DS. His back pain as decreased and he is not taking any pain medication. He is less tired than he was. He has an appointment with infectious disease tomorrow. He was upset that the specimen was not checked for infection. Order entered and sent to lab for culture.  Lab updated

## 2021-12-07 NOTE — TELEPHONE ENCOUNTER
Pt states he had a biopsy of his infected disk and the only thing it showed was that there was no cancer. Pt was upset that a culture wasn't ordered because he was under the assumption that the testing was done to figure out what bacteria was causing the infection. I did call and speak with micro at [facility], they are able to culture if they get a call back today but that the specimen would be considered compromised. He had an appt today with ortho but was not fully understanding of the test results. Pt is asking for you to call him back directly.        [phone] microbiology [facility] open 24/7

## 2021-12-07 NOTE — PROGRESS NOTES
Patient ID: Abiel Chen is a 70 y.o. male    Chief Compliant:  No chief complaint on file. 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 Osteomyelitis of thoracic vertebra (HCC)          Back pain is improving C-reactive protein improving more dramatically than sed rate getting slightly worse this is not unexpected given lack times etc.        Repeat labs show sed rate 53, CRP 8.2, CSF WBCs 894    Reorder sed rate and CRP to be drawn a couple days before his next appointment    IR biopsy shows no malignances    Follow up 1 week    HPI:  This is a 70 y.o. male who presents to the clinic today for osteomyelitis. Patient reports his back pain is gradually improving. He is currently on Keflex and Bactrim. Review of Systems   All other systems reviewed and are negative. Past History:    Current Outpatient Medications:     Bacillus Coagulans-Inulin (PROBIOTIC FORMULA) 1-250 BILLION-MG CAPS, Take 1 capsule by mouth 2 times daily, Disp: 60 capsule, Rfl: 2    cephALEXin (KEFLEX) 500 MG capsule, Take 1 capsule by mouth 4 times daily Take with food. , Disp: 112 capsule, Rfl: 1    sulfamethoxazole-trimethoprim (BACTRIM DS;SEPTRA DS) 800-160 MG per tablet, Take 1 tablet by mouth 2 times daily Take with food. , Disp: 56 tablet, Rfl: 0    Probiotic Product (DIFF-STAT) CAPS, Take 1 capsule by mouth 2 times daily, Disp: 60 capsule, Rfl: 3    doxycycline hyclate (VIBRA-TABS) 100 MG tablet, Take 1 tablet by mouth 2 times daily, Disp: 60 tablet, Rfl: 0    atenolol (TENORMIN) 25 MG tablet, Take 1 tablet by mouth 2 times daily, Disp: 180 tablet, Rfl: 3    amLODIPine (NORVASC) 5 MG tablet, Take 1 tablet by mouth daily, Disp: 90 tablet, Rfl: 3    acetaminophen (TYLENOL) 500 MG tablet, Take 500 mg by mouth every 6 hours as needed for Pain, Disp: , Rfl:   No Known Allergies  Social History     Socioeconomic History    Marital status:      Spouse name: Not on file    Number of children: Not on file    Years of education: Not on file    Highest education level: Not on file   Occupational History    Not on file   Tobacco Use    Smoking status: Never Smoker    Smokeless tobacco: Never Used   Vaping Use    Vaping Use: Never used   Substance and Sexual Activity    Alcohol use: No     Alcohol/week: 0.0 standard drinks     Comment: if any, social occasional    Drug use: No    Sexual activity: Yes     Partners: Female   Other Topics Concern    Not on file   Social History Narrative    Not on file     Social Determinants of Health     Financial Resource Strain: Unknown    Difficulty of Paying Living Expenses: Patient refused   Food Insecurity: Unknown    Worried About Running Out of Food in the Last Year: Patient refused    [address] in the Last Year: Patient refused   Transportation Needs:     Lack of Transportation (Medical): Not on file    Lack of Transportation (Non-Medical):  Not on file   Physical Activity:     Days of Exercise per Week: Not on file    Minutes of Exercise per Session: Not on file   Stress:     Feeling of Stress : Not on file   Social Connections:     Frequency of Communication with Friends and Family: Not on file    Frequency of Social Gatherings with Friends and Family: Not on file    Attends Restorationism Services: Not on file    Active Member of [address] [place] [address] [employer] or Organizations: Not on file    Attends Club or Organization Meetings: Not on file    Marital Status: Not on file   Intimate Partner Violence:     Fear of Current or Ex-Partner: Not on file    Emotionally Abused: Not on file    Physically Abused: Not on file    Sexually Abused: Not on file   Housing Stability:     Unable to Pay for Housing in the Last Year: Not on file    Number of [place] in the Last Year: Not on file    Unstable Housing in the Last Year: Not on file     Past Medical History:   Diagnosis Date    Bradycardia     Hypertension     Kidney stones     kidney stones x 2 episodes     Past Surgical History:   Procedure Laterality Date    CYSTOSCOPY  07/09/2021    with stent insertion    CYSTOSCOPY Left 7/15/2021    CYSTOSCOPY URETEROSCOPY HOLMIUM LASER WITH STENT CHANGE performed by Dasia Marin MD at [facility] Bilateral 2010    cataracts     [address]  12/3/2021     [address] 12/3/2021 [facility] SPECIAL PROCEDURES    KIDNEY STONE REMOVAL      VASECTOMY       Family History   Problem Relation Age of Onset    High Blood Pressure Mother     Cancer Father         Physical Exam:  Vitals signs and nursing note reviewed. Constitutional:       Appearance: well-developed. HENT:      Head: Normocephalic and atraumatic. Nose: Nose normal.   Eyes:      Conjunctiva/sclera: Conjunctivae normal.   Neck:      Musculoskeletal: Normal range of motion and neck supple. Pulmonary:      Effort: Pulmonary effort is normal. No respiratory distress. Musculoskeletal:      Comments: Normal gait     Skin:     General: Skin is warm and dry. Neurological:      Mental Status: Alert and oriented to person, place, and time. Sensory: No sensory deficit. Psychiatric:         Behavior: Behavior normal.         Thought Content: Thought content normal.        Provider Attestation:  Rhett King, personally performed the services described in this documentation. All medical record entries made by the scribe were at my direction and in my presence. I have reviewed the chart and discharge instructions and agree that the records reflect my personal performance and is accurate and complete. Arsh Hickey MD 12/7/21     Scribe Attestation:  By signing my name below, Gus An, attest that this documentation has been prepared under the direction and in the presence of Dr. Elida Goldstein.  Electronically signed: Leroy Garza, 12/7/21     Please note that this chart was generated using voice recognition Dragon dictation software. Although every effort was made to ensure the accuracy of this automated transcription, some errors in transcription may have occurred.

## 2021-12-07 NOTE — TELEPHONE ENCOUNTER
I called and spoke to [facility] at [facility]. [facility]. She stated she has to confirm with 1st shift tomorrow that they still have the specimen and they will call us back to confirm if she can run the cultures or not.

## 2021-12-08 ENCOUNTER — OFFICE VISIT (OUTPATIENT)
Dept: INFECTIOUS DISEASES | Age: 71
End: 2021-12-08
Payer: MEDICARE

## 2021-12-08 VITALS
BODY MASS INDEX: 32.21 KG/M2 | WEIGHT: 225 LBS | SYSTOLIC BLOOD PRESSURE: 133 MMHG | HEIGHT: 70 IN | RESPIRATION RATE: 16 BRPM | HEART RATE: 69 BPM | OXYGEN SATURATION: 98 % | TEMPERATURE: 97.2 F | DIASTOLIC BLOOD PRESSURE: 72 MMHG

## 2021-12-08 DIAGNOSIS — M46.44 DISCITIS OF THORACIC REGION: Primary | ICD-10-CM

## 2021-12-08 PROCEDURE — 3017F COLORECTAL CA SCREEN DOC REV: CPT | Performed by: INTERNAL MEDICINE

## 2021-12-08 PROCEDURE — G8417 CALC BMI ABV UP PARAM F/U: HCPCS | Performed by: INTERNAL MEDICINE

## 2021-12-08 PROCEDURE — G8427 DOCREV CUR MEDS BY ELIG CLIN: HCPCS | Performed by: INTERNAL MEDICINE

## 2021-12-08 PROCEDURE — 1036F TOBACCO NON-USER: CPT | Performed by: INTERNAL MEDICINE

## 2021-12-08 PROCEDURE — 99204 OFFICE O/P NEW MOD 45 MIN: CPT | Performed by: INTERNAL MEDICINE

## 2021-12-08 PROCEDURE — G8484 FLU IMMUNIZE NO ADMIN: HCPCS | Performed by: INTERNAL MEDICINE

## 2021-12-08 PROCEDURE — 1123F ACP DISCUSS/DSCN MKR DOCD: CPT | Performed by: INTERNAL MEDICINE

## 2021-12-08 PROCEDURE — 4040F PNEUMOC VAC/ADMIN/RCVD: CPT | Performed by: INTERNAL MEDICINE

## 2021-12-08 RX ORDER — LEVOFLOXACIN 750 MG/1
750 TABLET ORAL DAILY
Qty: 30 TABLET | Refills: 0 | Status: SHIPPED | OUTPATIENT
Start: 2021-12-08 | End: 2022-01-07

## 2021-12-08 NOTE — PROGRESS NOTES
Infectious disease Consult Note      Patient: Ron Kelly  : 1950  Acct#:  [de-identified]     Date:  2021    Subjective:       History of Present Illness  Patient is a 70 y.o.  male    Chief Complaint   Patient presents with    New Patient     osteomyelitis of thoracici verterbra / record in chart   The patient is complaining of thoracic spine pain for several weeks associated with subjective fever and fatigue  MRI of the thoracic spine 2021 suggestive of T9-T10 osteomyelitis/discitis  The patient had aspiration biopsy done 12/3/2021 apparently no culture was sent, cytology was negative for malignancy  The patient has been on oral antibiotics with some improvement of his back pain. He denied chills, no cough or shortness of breath, no urinary symptoms, no other complaints. History of Pseudomonas aeruginosa UTI in 2021 was sensitive to Cipro had kidney stones status post stent/stone removal.  He also had cholelithiasis on CT abdomen that was done 2021    Past Medical History:   Diagnosis Date    Bradycardia     Hypertension     Kidney stones     kidney stones x 2 episodes      Past Surgical History:   Procedure Laterality Date    CYSTOSCOPY  2021    with stent insertion    CYSTOSCOPY Left 7/15/2021    CYSTOSCOPY URETEROSCOPY HOLMIUM LASER WITH STENT CHANGE performed by Jennifer Martinez MD at [address] Bilateral 2010    cataracts     [facility]cl[facility]  12/3/2021     Rue Abddavidrenetta Jonoad [facility] 12/3/2021 [address] [facility] VASECTOMY            Admission Meds  Current Outpatient Medications on File Prior to Visit   Medication Sig Dispense Refill    cephALEXin (KEFLEX) 500 MG capsule Take 1 capsule by mouth 4 times daily Take with food. 112 capsule 1    sulfamethoxazole-trimethoprim (BACTRIM DS;SEPTRA DS) 800-160 MG per tablet Take 1 tablet by mouth 2 times daily Take with food.  56 tablet 0    atenolol (TENORMIN) 25 MG tablet Take 1 tablet by mouth 2 times daily 180 tablet 3    amLODIPine (NORVASC) 5 MG tablet Take 1 tablet by mouth daily 90 tablet 3    Bacillus Coagulans-Inulin (PROBIOTIC FORMULA) 1-250 BILLION-MG CAPS Take 1 capsule by mouth 2 times daily 60 capsule 2    Probiotic Product (DIFF-STAT) CAPS Take 1 capsule by mouth 2 times daily 60 capsule 3    acetaminophen (TYLENOL) 500 MG tablet Take 500 mg by mouth every 6 hours as needed for Pain       No current facility-administered medications on file prior to visit. Allergies  No Known Allergies     Social   Social History     Tobacco Use    Smoking status: Never Smoker    Smokeless tobacco: Never Used   Substance Use Topics    Alcohol use: No     Alcohol/week: 0.0 standard drinks     Comment: if any, social occasional               Family History   Problem Relation Age of Onset    High Blood Pressure Mother     Cancer Father           Review of Systems   Other than above 12 systems reviewed were negative . Physical Exam  /72   Pulse 69   Temp 97.2 °F (36.2 °C)   Resp 16   Ht 5' 10\" (1.778 m)   Wt 225 lb (102.1 kg)   SpO2 98%   BMI 32.28 kg/m²           General Appearance: alert and oriented to person, place and time, well-developed and well-nourished, in no acute distress  Skin: warm and dry, no rash or erythema  Head: normocephalic and atraumatic  Eyes: pupils equal, round, and reactive to light, extraocular eye movements intact, conjunctivae normal  ENT: hearing grossly normal bilaterally. Neck: neck supple and non tender . Pulmonary/Chest: clear to auscultation bilaterally- no wheezes, rales or rhonchi, normal air movement, no respiratory distress  Cardiovascular: normal rate, regular rhythm, normal S1 and S2, no murmurs.   Abdomen: soft, non-tender, non-distended, normal bowel sounds,   Extremities: no cyanosis, clubbing or edema  Musculoskeletal: normal range of motion, no joint swelling, deformity or tenderness  Neurologic: muscle strength normal    Data Review:    WBC   Date Value Ref Range Status   11/17/2021 4.9 3.5 - 11.0 k/uL Final   11/02/2021 6.3 3.5 - 11.0 k/uL Final   05/31/2016 8.8 3.5 - 11.0 k/uL Final     Hemoglobin   Date Value Ref Range Status   11/17/2021 12.8 (L) 13.5 - 17.5 g/dL Final   11/02/2021 13.4 (L) 13.5 - 17.5 g/dL Final   05/31/2016 14.5 13.5 - 17.5 g/dL Final     Hematocrit   Date Value Ref Range Status   11/17/2021 36.2 (L) 41 - 53 % Final   11/02/2021 38.9 (L) 41 - 53 % Final   05/31/2016 43.0 41 - 53 % Final     MCV   Date Value Ref Range Status   11/17/2021 90.4 80 - 100 fL Final   11/02/2021 91.8 80 - 100 fL Final   05/31/2016 93.1 80 - 100 fL Final     Platelets   Date Value Ref Range Status   12/03/2021 203 138 - 453 k/uL Final   11/17/2021 211 150 - 450 k/uL Final   11/02/2021 203 150 - 450 k/uL Final     Sodium   Date Value Ref Range Status   11/02/2021 141 135 - 144 mmol/L Final   07/23/2018 142 135 - 144 mmol/L Final   05/31/2016 140 135 - 144 mmol/L Final     Potassium   Date Value Ref Range Status   11/02/2021 4.0 3.7 - 5.3 mmol/L Final   07/23/2018 4.1 3.7 - 5.3 mmol/L Final   05/31/2016 4.0 3.7 - 5.3 mmol/L Final     Chloride   Date Value Ref Range Status   11/02/2021 105 98 - 107 mmol/L Final   07/23/2018 106 98 - 107 mmol/L Final   05/31/2016 104 98 - 107 mmol/L Final     CO2   Date Value Ref Range Status   11/02/2021 25 20 - 31 mmol/L Final   07/23/2018 24 20 - 31 mmol/L Final   05/31/2016 23 20 - 31 mmol/L Final     BUN   Date Value Ref Range Status   11/02/2021 15 8 - 23 mg/dL Final   09/15/2021 20 8 - 23 mg/dL Final   07/23/2018 13 8 - 23 mg/dL Final     CREATININE   Date Value Ref Range Status   11/02/2021 0.87 0.70 - 1.20 mg/dL Final   09/15/2021 0.89 0.70 - 1.20 mg/dL Final   07/23/2018 0.89 0.70 - 1.20 mg/dL Final     AST   Date Value Ref Range Status   11/02/2021 15 <40 U/L Final   05/31/2016 22 <40 U/L Final     ALT   Date Value Ref Range Status 11/02/2021 15 5 - 41 U/L Final   05/31/2016 26 5 - 41 U/L Final     Total Bilirubin   Date Value Ref Range Status   11/02/2021 0.37 0.3 - 1.2 mg/dL Final   05/31/2016 0.47 0.3 - 1.2 mg/dL Final     Alkaline Phosphatase   Date Value Ref Range Status   11/02/2021 105 40 - 129 U/L Final   05/31/2016 89 40 - 129 U/L Final     No results found for: LIPASE, AMYLASE  No results found for: PROTIME, INR  No results found for: PTT  No results found for: OCCULTBLD  No results found for: GLUMET     Imaging Studies:                           All appropriate imaging studies and reports reviewed: Yes  MRI THORACIC SPINE W WO CONTRAST    Result Date: 11/18/2021  EXAMINATION: MRI OF THE THORACIC SPINE WITHOUT AND WITH CONTRAST  11/16/2021 12:15 pm TECHNIQUE: Multiplanar multisequence MRI of the thoracic spine was performed without and with the administration of intravenous contrast. COMPARISON: CT abdomen from 11/02/2021 HISTORY: ORDERING SYSTEM PROVIDED HISTORY: Abnormal CT of the abdomen TECHNOLOGIST PROVIDED HISTORY: Reason for Exam: Abnormal CT of the abdomen R93.5 (ICD-10-CM); Flank pain Additional signs and symptoms: complains of abdominal pain for the past 2 months FINDINGS: BONES/ALIGNMENT: There is T1 hypointense, T2 hyperintense, enhancing intraosseous signal abnormality in the T9 and T10 vertebral bodies centered around the disc space. Vertebral heights are maintained. Alignment is normal.  There are scattered benign intraosseous hemangiomas which are separate from the aforementioned entity. SPINAL CORD: No abnormal cord signal is seen. SOFT TISSUES:  There is a right pleural effusion. There is right prevertebral/paraspinal enhancing soft tissue adjacent to the T9-T10 disc space. There is also increased enhancement in the right ventral epidural space at T9-T10 which mildly narrows the right ventral thecal sac. No abscess is identified.  DEGENERATIVE CHANGES: No significant spinal canal stenosis or neural foraminal narrowing of the thoracic spine. T9-T10 osteomyelitis discitis. Right prevertebral phlegmon and right ventral epidural phlegmon without abscess. RECOMMENDATIONS: Unavailable     MRI LUMBAR SPINE W WO CONTRAST    Result Date: 11/16/2021  EXAMINATION: MRI OF THE LUMBAR SPINE WITHOUT AND WITH CONTRAST  11/16/2021 12:15 pm TECHNIQUE: Multiplanar multisequence MRI of the lumbar spine was performed without and with the administration of intravenous contrast. COMPARISON: CT of the abdomen and pelvis, 11/02/2021. HISTORY: ORDERING SYSTEM PROVIDED HISTORY: Abnormal CT of the abdomen TECHNOLOGIST PROVIDED HISTORY: Additional signs and symptoms: complains of abdominal pain for the past 2 months FINDINGS: BONES/ALIGNMENT:  No fracture or joint dislocation. With respect to the lucent lesion seen on the previous CT along the left lateral L3 vertebral body, it corresponds to a T1 and T2 hyperintense lesion, which mostly suppresses on STIR sequence, likely representing a benign hemangioma. Small T1 and T2 hyperintense foci in the L5, S1, S2 and S3 vertebral bodies likely represent atypical hemangiomas and do not suppress on the STIR sequence. No bony destructive lesion is seen. SPINAL CORD:  The conus terminates normally. SOFT TISSUES: No abnormal enhancement is seen of the lumbar spine. No paraspinal mass identified. L1-L2: Moderate loss of disc height with small disc bulge. Small disc extrusion along the posterior wall of the L1 vertebral body in the midline. The disc extrusion measures approximately 9 x 4 mm in the axial plane and 18 mm in the craniocaudal plane. It results in mild central canal stenosis. No significant lateral recess or neural foraminal stenoses at this level. L2-L3: Moderate loss of disc height with a disc bulge. Annular tear in the left paracentral aspect of the posterior disc. Mild central canal and lateral recess stenoses. Moderate neural foraminal stenoses.  L3-L4: Moderate loss of disc height with a disc bulge. Annular tear in the posterior midline disc. Mild facet and ligamentous hypertrophy. Moderate central canal stenosis, with narrowing of the AP diameter of the thecal sac in the midsagittal plane to 8 mm. Moderate lateral recess and moderate to severe neural foraminal stenoses. L4-L5: Moderate loss of disc height with small disc bulge. Tiny annular tear in the posterior midline disc. Mild facet and ligamentous hypertrophy. Mild central canal and lateral recess stenoses. Moderate to severe neural foraminal stenoses. L5-S1: Small disc bulge. 12 x 7 mm disc extrusion along the posterior L5 vertebral body on the right, tracking cephalad from the disc. It extends to the proximal right neural foramina where it may impinge the right L5 nerve. Mild facet and ligamentous hypertrophy. Mild central canal and lateral recess stenoses. Moderate to severe neural foraminal stenoses. 1. With respect to the lucent lesion identified in the L3 vertebral body on the previous CT, it appears to correspond to a hemangioma. 2. Additional smaller T1 and T2 hyperintense foci in the L5, S1, S2 and S3 vertebral bodies likely represent atypical (lipid poor) hemangioma. 3. No bony destructive lesions seen. 4. Disc herniations at L1-2 and right L5-S1, as described. 5. Moderate central canal stenosis at L3-4. Mild stenoses at L1-2, L2-3, L4-5 and L5-S1. 6.  Multilevel neural foraminal stenoses, worst (moderate to severe) at L3-4, L4-5 and L5-S1. IR PERC ASPIRATION INTERVERT DISC    Result Date: 12/3/2021  PROCEDURE: PERC ASPIRATION INTERVERT DISC MODERATE CONSCIOUS SEDATION 12/3/2021 HISTORY: ORDERING SYSTEM PROVIDED HISTORY: Back pain, unspecified back location, unspecified back pain laterality, unspecified chronicity TECHNIQUE: Dose modulation, iterative reconstruction, and/or weight based adjustment of the mA/kV was utilized to reduce the radiation dose to as low as reasonably achievable.  CONTRAST: None SEDATION: 0 mgversed and 50 micro fentanyl were titrated intravenously for moderate sedation monitored under my direction. Total intraservice time of sedation was 15 minutes. The patient's vital signs were monitored throughout the procedure and recorded in the patient's medical record by the nurse. FLUOROSCOPY DOSE AND TYPE OR TIME AND EXPOSURES: 3.3 minutes, DAP 2997.7 micro gray meter squared DESCRIPTION OF PROCEDURE: Informed consent was obtained after a detailed explanation of the procedure including risks, benefits, and alternatives. Universal protocol was observed. After informed consent patient is placed prone on the table. The T9-T10 disc space was localized on oblique view. Thoracic spine area was prepped and draped sterile fashion. 1% lidocaine was infiltrated for local anesthesia. Under fluoroscopic guidance a 20 gauge Nikolai cutting needle was advanced into the disc space. Aspiration biopsy was performed. Samples collected and sent to pathology for culture sensitivity and cytology. Band-Aid was applied. Patient was returned holding stable condition. FINDINGS: Needle tip confirmed at the T9-T10 level. Successful fluoroscopic guided aspiration biopsy of the T9-T10 disc space with sample sent to lab. Assessment:   Thoracic spine discitis/osteomyelitis status post aspiration biopsy 12/3/2021 no culture was sent  History of Pseudomonas aeruginosa UTI July 2021/obstructive stone status post stent/stone removal    Recommendations:   Long discussion with the patient. He is refusing recommended IV antibiotics and would like to be treated with oral antibiotics. Discontinue oral Keflex and Bactrim  Oral Levaquin 750 mg daily for 6 weeks  QTC was 415 on 10/4/2021  Follow CBC, BUN/creatinine, C-reactive protein and sedimentation rate  The patient did not receive COVID-19 vaccine benefits was explained to the patient and was advised to receive it .   Orders Placed This Encounter Procedures    BUN & Creatinine     Standing Status:   Future     Standing Expiration Date:   12/8/2022    CBC     Standing Status:   Future     Standing Expiration Date:   12/8/2022         Thank you for allowing me to participate in the care of your patient. Please feel free to contact me with any questions or concerns.      Daylene Hodgkins, MD

## 2021-12-08 NOTE — TELEPHONE ENCOUNTER
Xiomara chandra from [facility] lab stating that there was no more fluid left over to do a culture.

## 2021-12-13 ENCOUNTER — HOSPITAL ENCOUNTER (OUTPATIENT)
Age: 71
Discharge: HOME | End: 2021-12-13
Payer: MEDICARE

## 2021-12-13 DIAGNOSIS — M46.24 OSTEOMYELITIS OF THORACIC VERTEBRA (HCC): ICD-10-CM

## 2021-12-13 DIAGNOSIS — M54.9 BACK PAIN, UNSPECIFIED BACK LOCATION, UNSPECIFIED BACK PAIN LATERALITY, UNSPECIFIED CHRONICITY: ICD-10-CM

## 2021-12-13 DIAGNOSIS — M46.44 DISCITIS OF THORACIC REGION: ICD-10-CM

## 2021-12-13 LAB
BUN BLDV-MCNC: 17 MG/DL (ref 8–23)
C-REACTIVE PROTEIN: 19.9 MG/L (ref 0–5)
CREAT SERPL-MCNC: 0.97 MG/DL (ref 0.7–1.2)
GFR AFRICAN AMERICAN: >60 ML/MIN
GFR NON-AFRICAN AMERICAN: >60 ML/MIN
GFR SERPL CREATININE-BSD FRML MDRD: NORMAL ML/MIN/{1.73_M2}
GFR SERPL CREATININE-BSD FRML MDRD: NORMAL ML/MIN/{1.73_M2}
HCT VFR BLD CALC: 40.2 % (ref 41–53)
HEMOGLOBIN: 13.7 G/DL (ref 13.5–17.5)
MCH RBC QN AUTO: 30.7 PG (ref 26–34)
MCHC RBC AUTO-ENTMCNC: 34.1 G/DL (ref 31–37)
MCV RBC AUTO: 90 FL (ref 80–100)
NRBC AUTOMATED: ABNORMAL PER 100 WBC
PDW BLD-RTO: 14.3 % (ref 11.5–14.9)
PLATELET # BLD: 200 K/UL (ref 150–450)
PMV BLD AUTO: 7.9 FL (ref 6–12)
RBC # BLD: 4.47 M/UL (ref 4.5–5.9)
SEDIMENTATION RATE, ERYTHROCYTE: 39 MM (ref 0–20)
WBC # BLD: 5.8 K/UL (ref 3.5–11)

## 2021-12-13 PROCEDURE — 84520 ASSAY OF UREA NITROGEN: CPT

## 2021-12-13 PROCEDURE — 36415 COLL VENOUS BLD VENIPUNCTURE: CPT

## 2021-12-13 PROCEDURE — 85027 COMPLETE CBC AUTOMATED: CPT

## 2021-12-13 PROCEDURE — 86140 C-REACTIVE PROTEIN: CPT

## 2021-12-13 PROCEDURE — 85652 RBC SED RATE AUTOMATED: CPT

## 2021-12-13 PROCEDURE — 82565 ASSAY OF CREATININE: CPT

## 2021-12-14 ENCOUNTER — OFFICE VISIT (OUTPATIENT)
Dept: ORTHOPEDIC SURGERY | Age: 71
End: 2021-12-14
Payer: MEDICARE

## 2021-12-14 VITALS — WEIGHT: 225 LBS | HEIGHT: 70 IN | BODY MASS INDEX: 32.21 KG/M2 | RESPIRATION RATE: 14 BRPM

## 2021-12-14 DIAGNOSIS — M46.24 OSTEOMYELITIS OF THORACIC VERTEBRA (HCC): Primary | ICD-10-CM

## 2021-12-14 PROCEDURE — G8427 DOCREV CUR MEDS BY ELIG CLIN: HCPCS | Performed by: ORTHOPAEDIC SURGERY

## 2021-12-14 PROCEDURE — G8484 FLU IMMUNIZE NO ADMIN: HCPCS | Performed by: ORTHOPAEDIC SURGERY

## 2021-12-14 PROCEDURE — G8417 CALC BMI ABV UP PARAM F/U: HCPCS | Performed by: ORTHOPAEDIC SURGERY

## 2021-12-14 PROCEDURE — 3017F COLORECTAL CA SCREEN DOC REV: CPT | Performed by: ORTHOPAEDIC SURGERY

## 2021-12-14 PROCEDURE — 1036F TOBACCO NON-USER: CPT | Performed by: ORTHOPAEDIC SURGERY

## 2021-12-14 PROCEDURE — 4040F PNEUMOC VAC/ADMIN/RCVD: CPT | Performed by: ORTHOPAEDIC SURGERY

## 2021-12-14 PROCEDURE — 99213 OFFICE O/P EST LOW 20 MIN: CPT | Performed by: ORTHOPAEDIC SURGERY

## 2021-12-14 PROCEDURE — 1123F ACP DISCUSS/DSCN MKR DOCD: CPT | Performed by: ORTHOPAEDIC SURGERY

## 2021-12-14 NOTE — PROGRESS NOTES
Patient ID: Liana Ordonez is a 70 y.o. male    Chief Compliant:  No chief complaint on file. Diagnostic imaging:        Assessment and Plan:  1. Osteomyelitis of thoracic vertebra (HCC)      Back pain is gradually improving    Sed rate 39- slightly improved, CRP 19.9- slightly worse    Follow up 4 weeks    HPI:  This is a 70 y.o. male who presents to the clinic today for follow up of osteomyelitis. Patient was seen by Infectious disease and it recommended that IV antibiotics however he declined and was started on Levaquin. He reports his back pain is gradually improving. He is taking ibuprofen occasionally with good relief of his pain. Review of Systems   All other systems reviewed and are negative. Past History:    Current Outpatient Medications:     levoFLOXacin (LEVAQUIN) 750 MG tablet, Take 1 tablet by mouth daily, Disp: 30 tablet, Rfl: 0    Bacillus Coagulans-Inulin (PROBIOTIC FORMULA) 1-250 BILLION-MG CAPS, Take 1 capsule by mouth 2 times daily, Disp: 60 capsule, Rfl: 2    cephALEXin (KEFLEX) 500 MG capsule, Take 1 capsule by mouth 4 times daily Take with food. , Disp: 112 capsule, Rfl: 1    sulfamethoxazole-trimethoprim (BACTRIM DS;SEPTRA DS) 800-160 MG per tablet, Take 1 tablet by mouth 2 times daily Take with food. , Disp: 56 tablet, Rfl: 0    Probiotic Product (DIFF-STAT) CAPS, Take 1 capsule by mouth 2 times daily, Disp: 60 capsule, Rfl: 3    atenolol (TENORMIN) 25 MG tablet, Take 1 tablet by mouth 2 times daily, Disp: 180 tablet, Rfl: 3    amLODIPine (NORVASC) 5 MG tablet, Take 1 tablet by mouth daily, Disp: 90 tablet, Rfl: 3    acetaminophen (TYLENOL) 500 MG tablet, Take 500 mg by mouth every 6 hours as needed for Pain, Disp: , Rfl:   No Known Allergies  Social History     Socioeconomic History    Marital status:      Spouse name: Not on file    Number of children: Not on file    Years of education: Not on file    Highest education level: Not on file Occupational History    Not on file   Tobacco Use    Smoking status: Never Smoker    Smokeless tobacco: Never Used   Vaping Use    Vaping Use: Never used   Substance and Sexual Activity    Alcohol use: No     Alcohol/week: 0.0 standard drinks     Comment: if any, social occasional    Drug use: No    Sexual activity: Yes     Partners: Female   Other Topics Concern    Not on file   Social History Narrative    Not on file     Social Determinants of Health     Financial Resource Strain: Unknown    Difficulty of Paying Living Expenses: Patient refused   Food Insecurity: Unknown    Worried About Running Out of Food in the Last Year: Patient refused    [address] in the Last Year: Patient refused   Transportation Needs:     Lack of Transportation (Medical): Not on file    Lack of Transportation (Non-Medical):  Not on file   Physical Activity:     Days of Exercise per Week: Not on file    Minutes of Exercise per Session: Not on file   Stress:     Feeling of Stress : Not on file   Social Connections:     Frequency of Communication with Friends and Family: Not on file    Frequency of Social Gatherings with Friends and Family: Not on file    Attends Je[employer]'s witness Services: Not on file    Active Member of [address] [place] [address] or Organizations: Not on file    Attends Club or Organization Meetings: Not on file    Marital Status: Not on file   Intimate Partner Violence:     Fear of Current or Ex-Partner: Not on file    Emotionally Abused: Not on file    Physically Abused: Not on file    Sexually Abused: Not on file   Housing Stability:     Unable to Pay for Housing in the Last Year: Not on file    Number of [place] in the Last Year: Not on file    Unstable Housing in the Last Year: Not on file     Past Medical History:   Diagnosis Date    Bradycardia     Hypertension     Kidney stones     kidney stones x 2 episodes     Past Surgical History:   Procedure Laterality Date    CYSTOSCOPY  07/09/2021    with stent insertion    CYSTOSCOPY Left 7/15/2021    CYSTOSCOPY URETEROSCOPY HOLMIUM LASER WITH STENT CHANGE performed by Amadou Fuchs MD at [facility] Bilateral 2010    cataracts     [facility]  12/3/2021     [facility] 12/3/2021 [facility] PROCEDURES    KIDNEY STONE REMOVAL      VASECTOMY       Family History   Problem Relation Age of Onset    High Blood Pressure Mother     Cancer Father         Physical Exam:  Vitals signs and nursing note reviewed. Constitutional:       Appearance: well-developed. HENT:      Head: Normocephalic and atraumatic. Nose: Nose normal.   Eyes:      Conjunctiva/sclera: Conjunctivae normal.   Neck:      Musculoskeletal: Normal range of motion and neck supple. Pulmonary:      Effort: Pulmonary effort is normal. No respiratory distress. Musculoskeletal:      Comments: Normal gait     Skin:     General: Skin is warm and dry. Neurological:      Mental Status: Alert and oriented to person, place, and time. Sensory: No sensory deficit. Psychiatric:         Behavior: Behavior normal.         Thought Content: Thought content normal.        Provider Attestation:  Pop King personally performed the services described in this documentation. All medical record entries made by the scribe were at my direction and in my presence. I have reviewed the chart and discharge instructions and agree that the records reflect my personal performance and is accurate and complete. Lázaro Elizondo MD 12/14/21     Scribe Attestation:  By signing my name below, Damon Lara, attest that this documentation has been prepared under the direction and in the presence of Dr. Brennan Ambrose. Electronically signed: Leroy Chavez, 12/14/21     Please note that this chart was generated using voice recognition Dragon dictation software.   Although every effort was made to ensure the accuracy of this automated transcription, some errors in transcription may have occurred.

## 2022-01-03 ENCOUNTER — TELEPHONE (OUTPATIENT)
Dept: INFECTIOUS DISEASES | Age: 72
End: 2022-01-03

## 2022-01-03 NOTE — TELEPHONE ENCOUNTER
Patient is going to run out of Levofloxacin before his appt with you on 1/12/22. I called in for 1 weeks worth to get him through. I phoned it in, please sign off on it.

## 2022-01-11 ENCOUNTER — HOSPITAL ENCOUNTER (OUTPATIENT)
Age: 72
Discharge: HOME | End: 2022-01-11
Payer: MEDICARE

## 2022-01-11 ENCOUNTER — OFFICE VISIT (OUTPATIENT)
Dept: ORTHOPEDIC SURGERY | Age: 72
End: 2022-01-11
Payer: MEDICARE

## 2022-01-11 VITALS — RESPIRATION RATE: 14 BRPM | WEIGHT: 225 LBS | HEIGHT: 70 IN | BODY MASS INDEX: 32.21 KG/M2

## 2022-01-11 DIAGNOSIS — M46.24 OSTEOMYELITIS OF THORACIC VERTEBRA (HCC): ICD-10-CM

## 2022-01-11 DIAGNOSIS — M46.24 OSTEOMYELITIS OF THORACIC VERTEBRA (HCC): Primary | ICD-10-CM

## 2022-01-11 LAB
C-REACTIVE PROTEIN: <3 MG/L (ref 0–5)
SEDIMENTATION RATE, ERYTHROCYTE: 17 MM (ref 0–20)

## 2022-01-11 PROCEDURE — G8427 DOCREV CUR MEDS BY ELIG CLIN: HCPCS | Performed by: ORTHOPAEDIC SURGERY

## 2022-01-11 PROCEDURE — 3017F COLORECTAL CA SCREEN DOC REV: CPT | Performed by: ORTHOPAEDIC SURGERY

## 2022-01-11 PROCEDURE — 36415 COLL VENOUS BLD VENIPUNCTURE: CPT

## 2022-01-11 PROCEDURE — 1036F TOBACCO NON-USER: CPT | Performed by: ORTHOPAEDIC SURGERY

## 2022-01-11 PROCEDURE — 99213 OFFICE O/P EST LOW 20 MIN: CPT | Performed by: ORTHOPAEDIC SURGERY

## 2022-01-11 PROCEDURE — 85652 RBC SED RATE AUTOMATED: CPT

## 2022-01-11 PROCEDURE — 86140 C-REACTIVE PROTEIN: CPT

## 2022-01-11 PROCEDURE — G8484 FLU IMMUNIZE NO ADMIN: HCPCS | Performed by: ORTHOPAEDIC SURGERY

## 2022-01-11 PROCEDURE — 4040F PNEUMOC VAC/ADMIN/RCVD: CPT | Performed by: ORTHOPAEDIC SURGERY

## 2022-01-11 PROCEDURE — G8417 CALC BMI ABV UP PARAM F/U: HCPCS | Performed by: ORTHOPAEDIC SURGERY

## 2022-01-11 PROCEDURE — 1123F ACP DISCUSS/DSCN MKR DOCD: CPT | Performed by: ORTHOPAEDIC SURGERY

## 2022-01-11 NOTE — PROGRESS NOTES
Patient ID: Rodger Lemon is a 70 y.o. 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 Osteomyelitis of thoracic vertebra (HCC)      Would like to see the patient on oral antibiotics for 12 weeks since we did not do IV      Patient's back pain continues to gradually improve    Sed rate and CRP    Follow up 3 weeks    HPI:  This is a 70 y.o. male who presents to the clinic today for follow up of osteomyelitis. Patient reports his back pain continues to improve. He remains on Levaquin and is scheduled to be reevaluated by infectious disease tomorrow. Review of Systems   All other systems reviewed and are negative. Past History:    Current Outpatient Medications:     Bacillus Coagulans-Inulin (PROBIOTIC FORMULA) 1-250 BILLION-MG CAPS, Take 1 capsule by mouth 2 times daily, Disp: 60 capsule, Rfl: 2    cephALEXin (KEFLEX) 500 MG capsule, Take 1 capsule by mouth 4 times daily Take with food. (Patient not taking: Reported on 12/14/2021), Disp: 112 capsule, Rfl: 1    sulfamethoxazole-trimethoprim (BACTRIM DS;SEPTRA DS) 800-160 MG per tablet, Take 1 tablet by mouth 2 times daily Take with food.  (Patient not taking: Reported on 12/14/2021), Disp: 56 tablet, Rfl: 0    Probiotic Product (DIFF-STAT) CAPS, Take 1 capsule by mouth 2 times daily, Disp: 60 capsule, Rfl: 3    atenolol (TENORMIN) 25 MG tablet, Take 1 tablet by mouth 2 times daily, Disp: 180 tablet, Rfl: 3    amLODIPine (NORVASC) 5 MG tablet, Take 1 tablet by mouth daily, Disp: 90 tablet, Rfl: 3    acetaminophen (TYLENOL) 500 MG tablet, Take 500 mg by mouth every 6 hours as needed for Pain, Disp: , Rfl:   No Known Allergies  Social History     Socioeconomic History    Marital status:      Spouse name: Not on file    Number of children: Not on file    Years of education: Not on file    Highest education level: Not on file   Occupational History    Not on file   Tobacco Use    Smoking status: Never Smoker    Smokeless tobacco: Never Used   Vaping Use    Vaping Use: Never used   Substance and Sexual Activity    Alcohol use: No     Alcohol/week: 0.0 standard drinks     Comment: if any, social occasional    Drug use: No    Sexual activity: Yes     Partners: Female   Other Topics Concern    Not on file   Social History Narrative    Not on file     Social Determinants of Health     Financial Resource Strain: Unknown    Difficulty of Paying Living Expenses: Patient refused   Food Insecurity: Unknown    Worried About Running Out of Food in the Last Year: Patient refused    [address] in the Last Year: Patient refused   Transportation Needs:     Lack of Transportation (Medical): Not on file    Lack of Transportation (Non-Medical):  Not on file   Physical Activity:     Days of Exercise per Week: Not on file    Minutes of Exercise per Session: Not on file   Stress:     Feeling of Stress : Not on file   Social Connections:     Frequency of Communication with Friends and Family: Not on file    Frequency of Social Gatherings with Friends and Family: Not on file    Attends [employer] Services: Not on file    Active Member of [address] [place] [address] or Organizations: Not on file    Attends Club or Organization Meetings: Not on file    Marital Status: Not on file   Intimate Partner Violence:     Fear of Current or Ex-Partner: Not on file    Emotionally Abused: Not on file    Physically Abused: Not on file    Sexually Abused: Not on file   Housing Stability:     Unable to Pay for Housing in the Last Year: Not on file    Number of [place] in the Last Year: Not on file    Unstable Housing in the Last Year: Not on file     Past Medical History:   Diagnosis Date    Bradycardia     Hypertension     Kidney stones     kidney stones x 2 episodes     Past Surgical History:   Procedure Laterality Date    CYSTOSCOPY  07/09/2021    with stent insertion    CYSTOSCOPY Left 7/15/2021    CYSTOSCOPY URETEROSCOPY HOLMIUM LASER WITH STENT CHANGE performed by Lakeshia Brown MD at [address] Bilateral 2010    cataracts     [address]  12/3/2021     [address] 12/3/2021 [facility]    KIDNEY STONE REMOVAL      VASECTOMY       Family History   Problem Relation Age of Onset    High Blood Pressure Mother     Cancer Father         Physical Exam:  Vitals signs and nursing note reviewed. Constitutional:       Appearance: well-developed. HENT:      Head: Normocephalic and atraumatic. Nose: Nose normal.   Eyes:      Conjunctiva/sclera: Conjunctivae normal.   Neck:      Musculoskeletal: Normal range of motion and neck supple. Pulmonary:      Effort: Pulmonary effort is normal. No respiratory distress. Musculoskeletal:      Comments: Normal gait     Skin:     General: Skin is warm and dry. Neurological:      Mental Status: Alert and oriented to person, place, and time. Sensory: No sensory deficit. Psychiatric:         Behavior: Behavior normal.         Thought Content: Thought content normal.        Provider Attestation:  Jeff iKng, personally performed the services described in this documentation. All medical record entries made by the scribe were at my direction and in my presence. I have reviewed the chart and discharge instructions and agree that the records reflect my personal performance and is accurate and complete. Jen Reynolds MD 1/11/22     Scribe Attestation:  By signing my name below, Jaleesa Serra attest that this documentation has been prepared under the direction and in the presence of Dr. Jessica Nieto.  Electronically signed: Leroy Saucedo, 1/11/22     Please note that this chart was generated using voice recognition Dragon dictation software. Although every effort was made to ensure the accuracy of this automated transcription, some errors in transcription may have occurred.

## 2022-01-12 ENCOUNTER — OFFICE VISIT (OUTPATIENT)
Dept: INFECTIOUS DISEASES | Age: 72
End: 2022-01-12
Payer: MEDICARE

## 2022-01-12 VITALS
HEIGHT: 70 IN | SYSTOLIC BLOOD PRESSURE: 120 MMHG | BODY MASS INDEX: 32.21 KG/M2 | OXYGEN SATURATION: 98 % | RESPIRATION RATE: 17 BRPM | WEIGHT: 225 LBS | HEART RATE: 67 BPM | DIASTOLIC BLOOD PRESSURE: 64 MMHG | TEMPERATURE: 97.3 F

## 2022-01-12 DIAGNOSIS — M86.9 OSTEOMYELITIS, UNSPECIFIED SITE, UNSPECIFIED TYPE (HCC): Primary | ICD-10-CM

## 2022-01-12 PROCEDURE — 4040F PNEUMOC VAC/ADMIN/RCVD: CPT | Performed by: INTERNAL MEDICINE

## 2022-01-12 PROCEDURE — 1123F ACP DISCUSS/DSCN MKR DOCD: CPT | Performed by: INTERNAL MEDICINE

## 2022-01-12 PROCEDURE — G8427 DOCREV CUR MEDS BY ELIG CLIN: HCPCS | Performed by: INTERNAL MEDICINE

## 2022-01-12 PROCEDURE — 99214 OFFICE O/P EST MOD 30 MIN: CPT | Performed by: INTERNAL MEDICINE

## 2022-01-12 PROCEDURE — G8417 CALC BMI ABV UP PARAM F/U: HCPCS | Performed by: INTERNAL MEDICINE

## 2022-01-12 PROCEDURE — G8484 FLU IMMUNIZE NO ADMIN: HCPCS | Performed by: INTERNAL MEDICINE

## 2022-01-12 PROCEDURE — 1036F TOBACCO NON-USER: CPT | Performed by: INTERNAL MEDICINE

## 2022-01-12 PROCEDURE — 3017F COLORECTAL CA SCREEN DOC REV: CPT | Performed by: INTERNAL MEDICINE

## 2022-01-12 RX ORDER — LEVOTHYROXINE SODIUM 175 UG/1
750 TABLET ORAL DAILY
COMMUNITY

## 2022-01-12 RX ORDER — LEVOFLOXACIN 750 MG/1
750 TABLET ORAL DAILY
Qty: 30 TABLET | Refills: 0 | Status: SHIPPED | OUTPATIENT
Start: 2022-01-12 | End: 2022-02-11

## 2022-01-12 NOTE — PROGRESS NOTES
Infectious disease Consult Note      Patient: Chris Ramey  : 1950  Acct#:  [de-identified]     Date:  2022    Subjective:       History of Present Illness  Patient is a 70 y.o.  male    Chief Complaint   Patient presents with    Follow-up     1 month follow up   The patient is complaining of thoracic spine pain for several weeks associated with subjective fever and fatigue  MRI of the thoracic spine 2021 suggestive of T9-T10 osteomyelitis/discitis  The patient had aspiration biopsy done 12/3/2021 apparently no culture was sent, cytology was negative for malignancy  The patient was treated with doxycycline on 2021 that was changed to Bactrim and Keflex on 2021    History of Pseudomonas aeruginosa UTI in 2021 was sensitive to Cipro had kidney stones status post stent/stone removal.  He also had cholelithiasis on CT abdomen that was done 2021   interval history   The patient was started on oral levofloxacin 2021 that he is tolerating. He is feeling better, still complaining of thoracic spine pain, denied fever or chills, denied nausea or vomiting, no diarrhea, no other complaints.   2022 C-reactive protein less than 3 and sedimentation rate 17  On 2021 sedimentation rate was 39 and C-reactive protein 19.9  Past Medical History:   Diagnosis Date    Bradycardia     Hypertension     Kidney stones     kidney stones x 2 episodes      Past Surgical History:   Procedure Laterality Date    CYSTOSCOPY  2021    with stent insertion    CYSTOSCOPY Left 7/15/2021    CYSTOSCOPY URETEROSCOPY HOLMIUM LASER WITH STENT CHANGE performed by Adonis Nino MD at [facility] Bilateral 2010    cataracts     [address]  12/3/2021     [address] 12/3/2021 [facility]    KIDNEY STONE REMOVAL      VASECTOMY            Admission Meds  Current Outpatient Medications on File Prior to Visit Medication Sig Dispense Refill    levothyroxine (SYNTHROID) 175 MCG tablet Take 750 mcg by mouth Daily      Bacillus Coagulans-Inulin (PROBIOTIC FORMULA) 1-250 BILLION-MG CAPS Take 1 capsule by mouth 2 times daily 60 capsule 2    sulfamethoxazole-trimethoprim (BACTRIM DS;SEPTRA DS) 800-160 MG per tablet Take 1 tablet by mouth 2 times daily Take with food. 56 tablet 0    Probiotic Product (DIFF-STAT) CAPS Take 1 capsule by mouth 2 times daily 60 capsule 3    atenolol (TENORMIN) 25 MG tablet Take 1 tablet by mouth 2 times daily 180 tablet 3    amLODIPine (NORVASC) 5 MG tablet Take 1 tablet by mouth daily 90 tablet 3    acetaminophen (TYLENOL) 500 MG tablet Take 500 mg by mouth every 6 hours as needed for Pain      cephALEXin (KEFLEX) 500 MG capsule Take 1 capsule by mouth 4 times daily Take with food. (Patient not taking: Reported on 1/12/2022) 112 capsule 1     No current facility-administered medications on file prior to visit. Allergies  No Known Allergies     Social   Social History     Tobacco Use    Smoking status: Never Smoker    Smokeless tobacco: Never Used   Substance Use Topics    Alcohol use: No     Alcohol/week: 0.0 standard drinks     Comment: if any, social occasional               Family History   Problem Relation Age of Onset    High Blood Pressure Mother     Cancer Father           Review of Systems   Other than above 12 systems reviewed were negative . Physical Exam  /64   Pulse 67   Temp 97.3 °F (36.3 °C)   Resp 17   Ht 5' 10\" (1.778 m)   Wt 225 lb (102.1 kg)   SpO2 98%   BMI 32.28 kg/m²           General Appearance: alert and oriented to person, place and time, well-developed and well-nourished, in no acute distress  Skin: warm and dry, no rash or erythema  Head: normocephalic and atraumatic  Eyes: pupils equal, round, and reactive to light, extraocular eye movements intact, conjunctivae normal  ENT: hearing grossly normal bilaterally.   Neck: neck supple and non tender . Pulmonary/Chest: clear to auscultation bilaterally- no wheezes, rales or rhonchi, normal air movement, no respiratory distress  Cardiovascular: normal rate, regular rhythm, normal S1 and S2, no murmurs.   Abdomen: soft, non-tender, non-distended, normal bowel sounds,   Extremities: no cyanosis, clubbing or edema  Musculoskeletal: normal range of motion, no joint swelling, deformity or tenderness  Neurologic: muscle strength normal    Data Review:    WBC   Date Value Ref Range Status   12/13/2021 5.8 3.5 - 11.0 k/uL Final   11/17/2021 4.9 3.5 - 11.0 k/uL Final   11/02/2021 6.3 3.5 - 11.0 k/uL Final     Hemoglobin   Date Value Ref Range Status   12/13/2021 13.7 13.5 - 17.5 g/dL Final   11/17/2021 12.8 (L) 13.5 - 17.5 g/dL Final   11/02/2021 13.4 (L) 13.5 - 17.5 g/dL Final     Hematocrit   Date Value Ref Range Status   12/13/2021 40.2 (L) 41 - 53 % Final   11/17/2021 36.2 (L) 41 - 53 % Final   11/02/2021 38.9 (L) 41 - 53 % Final     MCV   Date Value Ref Range Status   12/13/2021 90.0 80 - 100 fL Final   11/17/2021 90.4 80 - 100 fL Final   11/02/2021 91.8 80 - 100 fL Final     Platelets   Date Value Ref Range Status   12/13/2021 200 150 - 450 k/uL Final   12/03/2021 203 138 - 453 k/uL Final   11/17/2021 211 150 - 450 k/uL Final     Sodium   Date Value Ref Range Status   11/02/2021 141 135 - 144 mmol/L Final   07/23/2018 142 135 - 144 mmol/L Final   05/31/2016 140 135 - 144 mmol/L Final     Potassium   Date Value Ref Range Status   11/02/2021 4.0 3.7 - 5.3 mmol/L Final   07/23/2018 4.1 3.7 - 5.3 mmol/L Final   05/31/2016 4.0 3.7 - 5.3 mmol/L Final     Chloride   Date Value Ref Range Status   11/02/2021 105 98 - 107 mmol/L Final   07/23/2018 106 98 - 107 mmol/L Final   05/31/2016 104 98 - 107 mmol/L Final     CO2   Date Value Ref Range Status   11/02/2021 25 20 - 31 mmol/L Final   07/23/2018 24 20 - 31 mmol/L Final   05/31/2016 23 20 - 31 mmol/L Final     BUN   Date Value Ref Range Status 12/13/2021 17 8 - 23 mg/dL Final   11/02/2021 15 8 - 23 mg/dL Final   09/15/2021 20 8 - 23 mg/dL Final     CREATININE   Date Value Ref Range Status   12/13/2021 0.97 0.70 - 1.20 mg/dL Final   11/02/2021 0.87 0.70 - 1.20 mg/dL Final   09/15/2021 0.89 0.70 - 1.20 mg/dL Final     AST   Date Value Ref Range Status   11/02/2021 15 <40 U/L Final   05/31/2016 22 <40 U/L Final     ALT   Date Value Ref Range Status   11/02/2021 15 5 - 41 U/L Final   05/31/2016 26 5 - 41 U/L Final     Total Bilirubin   Date Value Ref Range Status   11/02/2021 0.37 0.3 - 1.2 mg/dL Final   05/31/2016 0.47 0.3 - 1.2 mg/dL Final     Alkaline Phosphatase   Date Value Ref Range Status   11/02/2021 105 40 - 129 U/L Final   05/31/2016 89 40 - 129 U/L Final     No results found for: LIPASE, AMYLASE  No results found for: PROTIME, INR  No results found for: PTT  No results found for: OCCULTBLD  No results found for: GLUMET     Imaging Studies:                           All appropriate imaging studies and reports reviewed: Yes  MRI THORACIC SPINE W WO CONTRAST    Result Date: 11/18/2021  EXAMINATION: MRI OF THE THORACIC SPINE WITHOUT AND WITH CONTRAST  11/16/2021 12:15 pm TECHNIQUE: Multiplanar multisequence MRI of the thoracic spine was performed without and with the administration of intravenous contrast. COMPARISON: CT abdomen from 11/02/2021 HISTORY: ORDERING SYSTEM PROVIDED HISTORY: Abnormal CT of the abdomen TECHNOLOGIST PROVIDED HISTORY: Reason for Exam: Abnormal CT of the abdomen R93.5 (ICD-10-CM); Flank pain Additional signs and symptoms: complains of abdominal pain for the past 2 months FINDINGS: BONES/ALIGNMENT: There is T1 hypointense, T2 hyperintense, enhancing intraosseous signal abnormality in the T9 and T10 vertebral bodies centered around the disc space. Vertebral heights are maintained. Alignment is normal.  There are scattered benign intraosseous hemangiomas which are separate from the aforementioned entity.  SPINAL CORD: No abnormal cord signal is seen. SOFT TISSUES:  There is a right pleural effusion. There is right prevertebral/paraspinal enhancing soft tissue adjacent to the T9-T10 disc space. There is also increased enhancement in the right ventral epidural space at T9-T10 which mildly narrows the right ventral thecal sac. No abscess is identified. DEGENERATIVE CHANGES: No significant spinal canal stenosis or neural foraminal narrowing of the thoracic spine. T9-T10 osteomyelitis discitis. Right prevertebral phlegmon and right ventral epidural phlegmon without abscess. RECOMMENDATIONS: Unavailable     MRI LUMBAR SPINE W WO CONTRAST    Result Date: 11/16/2021  EXAMINATION: MRI OF THE LUMBAR SPINE WITHOUT AND WITH CONTRAST  11/16/2021 12:15 pm TECHNIQUE: Multiplanar multisequence MRI of the lumbar spine was performed without and with the administration of intravenous contrast. COMPARISON: CT of the abdomen and pelvis, 11/02/2021. HISTORY: ORDERING SYSTEM PROVIDED HISTORY: Abnormal CT of the abdomen TECHNOLOGIST PROVIDED HISTORY: Additional signs and symptoms: complains of abdominal pain for the past 2 months FINDINGS: BONES/ALIGNMENT:  No fracture or joint dislocation. With respect to the lucent lesion seen on the previous CT along the left lateral L3 vertebral body, it corresponds to a T1 and T2 hyperintense lesion, which mostly suppresses on STIR sequence, likely representing a benign hemangioma. Small T1 and T2 hyperintense foci in the L5, S1, S2 and S3 vertebral bodies likely represent atypical hemangiomas and do not suppress on the STIR sequence. No bony destructive lesion is seen. SPINAL CORD:  The conus terminates normally. SOFT TISSUES: No abnormal enhancement is seen of the lumbar spine. No paraspinal mass identified. L1-L2: Moderate loss of disc height with small disc bulge. Small disc extrusion along the posterior wall of the L1 vertebral body in the midline.  The disc extrusion measures approximately 9 x 4 mm in the axial plane and 18 mm in the craniocaudal plane. It results in mild central canal stenosis. No significant lateral recess or neural foraminal stenoses at this level. L2-L3: Moderate loss of disc height with a disc bulge. Annular tear in the left paracentral aspect of the posterior disc. Mild central canal and lateral recess stenoses. Moderate neural foraminal stenoses. L3-L4: Moderate loss of disc height with a disc bulge. Annular tear in the posterior midline disc. Mild facet and ligamentous hypertrophy. Moderate central canal stenosis, with narrowing of the AP diameter of the thecal sac in the midsagittal plane to 8 mm. Moderate lateral recess and moderate to severe neural foraminal stenoses. L4-L5: Moderate loss of disc height with small disc bulge. Tiny annular tear in the posterior midline disc. Mild facet and ligamentous hypertrophy. Mild central canal and lateral recess stenoses. Moderate to severe neural foraminal stenoses. L5-S1: Small disc bulge. 12 x 7 mm disc extrusion along the posterior L5 vertebral body on the right, tracking cephalad from the disc. It extends to the proximal right neural foramina where it may impinge the right L5 nerve. Mild facet and ligamentous hypertrophy. Mild central canal and lateral recess stenoses. Moderate to severe neural foraminal stenoses. 1. With respect to the lucent lesion identified in the L3 vertebral body on the previous CT, it appears to correspond to a hemangioma. 2. Additional smaller T1 and T2 hyperintense foci in the L5, S1, S2 and S3 vertebral bodies likely represent atypical (lipid poor) hemangioma. 3. No bony destructive lesions seen. 4. Disc herniations at L1-2 and right L5-S1, as described. 5. Moderate central canal stenosis at L3-4. Mild stenoses at L1-2, L2-3, L4-5 and L5-S1. 6.  Multilevel neural foraminal stenoses, worst (moderate to severe) at L3-4, L4-5 and L5-S1.      IR PERC ASPIRATION INTERVERT DISC    Result Date: 12/3/2021  PROCEDURE: PERC ASPIRATION INTERVERT DISC MODERATE CONSCIOUS SEDATION 12/3/2021 HISTORY: ORDERING SYSTEM PROVIDED HISTORY: Back pain, unspecified back location, unspecified back pain laterality, unspecified chronicity TECHNIQUE: Dose modulation, iterative reconstruction, and/or weight based adjustment of the mA/kV was utilized to reduce the radiation dose to as low as reasonably achievable. CONTRAST: None SEDATION: 0 mgversed and 50 micro fentanyl were titrated intravenously for moderate sedation monitored under my direction. Total intraservice time of sedation was 15 minutes. The patient's vital signs were monitored throughout the procedure and recorded in the patient's medical record by the nurse. FLUOROSCOPY DOSE AND TYPE OR TIME AND EXPOSURES: 3.3 minutes, DAP 2997.7 micro gray meter squared DESCRIPTION OF PROCEDURE: Informed consent was obtained after a detailed explanation of the procedure including risks, benefits, and alternatives. Universal protocol was observed. After informed consent patient is placed prone on the table. The T9-T10 disc space was localized on oblique view. Thoracic spine area was prepped and draped sterile fashion. 1% lidocaine was infiltrated for local anesthesia. Under fluoroscopic guidance a 20 gauge Nikolai cutting needle was advanced into the disc space. Aspiration biopsy was performed. Samples collected and sent to pathology for culture sensitivity and cytology. Band-Aid was applied. Patient was returned holding stable condition. FINDINGS: Needle tip confirmed at the T9-T10 level. Successful fluoroscopic guided aspiration biopsy of the T9-T10 disc space with sample sent to lab.                    Assessment:   Thoracic spine discitis/osteomyelitis status post aspiration biopsy 12/3/2021 no culture was sent  History of Pseudomonas aeruginosa UTI July 2021/obstructive stone status post stent/stone removal    Recommendations:     Oral Levaquin 750 mg daily for 4 weeks  QTC was 415 on 10/4/2021  Follow CBC, BUN/creatinine, C-reactive protein and sedimentation rate  Follow-up in 4 weeks  No orders of the defined types were placed in this encounter. Thank you for allowing me to participate in the care of your patient. Please feel free to contact me with any questions or concerns.      Eduardo Fowler MD

## 2022-01-18 RX ORDER — LEVOFLOXACIN 750 MG/1
750 TABLET ORAL DAILY
Qty: 7 TABLET | Refills: 0 | OUTPATIENT
Start: 2022-01-18 | End: 2022-01-25

## 2022-02-01 ENCOUNTER — OFFICE VISIT (OUTPATIENT)
Dept: ORTHOPEDIC SURGERY | Age: 72
End: 2022-02-01
Payer: MEDICARE

## 2022-02-01 VITALS — BODY MASS INDEX: 32.21 KG/M2 | RESPIRATION RATE: 14 BRPM | HEIGHT: 70 IN | WEIGHT: 225 LBS

## 2022-02-01 DIAGNOSIS — M46.24 OSTEOMYELITIS OF THORACIC VERTEBRA (HCC): Primary | ICD-10-CM

## 2022-02-01 PROCEDURE — 1036F TOBACCO NON-USER: CPT | Performed by: ORTHOPAEDIC SURGERY

## 2022-02-01 PROCEDURE — 4040F PNEUMOC VAC/ADMIN/RCVD: CPT | Performed by: ORTHOPAEDIC SURGERY

## 2022-02-01 PROCEDURE — 3017F COLORECTAL CA SCREEN DOC REV: CPT | Performed by: ORTHOPAEDIC SURGERY

## 2022-02-01 PROCEDURE — G8484 FLU IMMUNIZE NO ADMIN: HCPCS | Performed by: ORTHOPAEDIC SURGERY

## 2022-02-01 PROCEDURE — G8417 CALC BMI ABV UP PARAM F/U: HCPCS | Performed by: ORTHOPAEDIC SURGERY

## 2022-02-01 PROCEDURE — 99213 OFFICE O/P EST LOW 20 MIN: CPT | Performed by: ORTHOPAEDIC SURGERY

## 2022-02-01 PROCEDURE — 1123F ACP DISCUSS/DSCN MKR DOCD: CPT | Performed by: ORTHOPAEDIC SURGERY

## 2022-02-01 PROCEDURE — G8427 DOCREV CUR MEDS BY ELIG CLIN: HCPCS | Performed by: ORTHOPAEDIC SURGERY

## 2022-02-01 NOTE — PROGRESS NOTES
Patient ID: Ron Nixon is a 70 y.o. male    Chief Compliant:  Chief Complaint   Patient presents with    Back Pain        Diagnostic imaging:    Sed rate: 17    CRP: <3.0        Assessment and Plan:  1. Osteomyelitis of thoracic vertebra (HCC)      Back pain and labs significantly improved    Follow up in 1 month with final x-rays    HPI:  This is a 70 y.o. male who presents to the clinic today for follow up of osteomyelitis. Treatment started 11/23. Patient notes thoracic back pain has significantly improved. No fever or chills. Patient remains on Levaquin, will be on the medication for about 3 more weeks. Review of Systems   All other systems reviewed and are negative. Past History:    Current Outpatient Medications:     levothyroxine (SYNTHROID) 175 MCG tablet, Take 750 mcg by mouth Daily, Disp: , Rfl:     levoFLOXacin (LEVAQUIN) 750 MG tablet, Take 1 tablet by mouth daily, Disp: 30 tablet, Rfl: 0    Bacillus Coagulans-Inulin (PROBIOTIC FORMULA) 1-250 BILLION-MG CAPS, Take 1 capsule by mouth 2 times daily, Disp: 60 capsule, Rfl: 2    cephALEXin (KEFLEX) 500 MG capsule, Take 1 capsule by mouth 4 times daily Take with food. (Patient not taking: Reported on 1/12/2022), Disp: 112 capsule, Rfl: 1    sulfamethoxazole-trimethoprim (BACTRIM DS;SEPTRA DS) 800-160 MG per tablet, Take 1 tablet by mouth 2 times daily Take with food. , Disp: 56 tablet, Rfl: 0    Probiotic Product (DIFF-STAT) CAPS, Take 1 capsule by mouth 2 times daily, Disp: 60 capsule, Rfl: 3    atenolol (TENORMIN) 25 MG tablet, Take 1 tablet by mouth 2 times daily, Disp: 180 tablet, Rfl: 3    amLODIPine (NORVASC) 5 MG tablet, Take 1 tablet by mouth daily, Disp: 90 tablet, Rfl: 3    acetaminophen (TYLENOL) 500 MG tablet, Take 500 mg by mouth every 6 hours as needed for Pain, Disp: , Rfl:   No Known Allergies  Social History     Socioeconomic History    Marital status:      Spouse name: Not on file    Number of children: Not on file    Years of education: Not on file    Highest education level: Not on file   Occupational History    Not on file   Tobacco Use    Smoking status: Never Smoker    Smokeless tobacco: Never Used   Vaping Use    Vaping Use: Never used   Substance and Sexual Activity    Alcohol use: No     Alcohol/week: 0.0 standard drinks     Comment: if any, social occasional    Drug use: No    Sexual activity: Yes     Partners: Female   Other Topics Concern    Not on file   Social History Narrative    Not on file     Social Determinants of Health     Financial Resource Strain: Unknown    Difficulty of Paying Living Expenses: Patient refused   Food Insecurity: Unknown    Worried About Running Out of Food in the Last Year: Patient refused    [address] in the Last Year: Patient refused   Transportation Needs:     Lack of Transportation (Medical): Not on file    Lack of Transportation (Non-Medical):  Not on file   Physical Activity:     Days of Exercise per Week: Not on file    Minutes of Exercise per Session: Not on file   Stress:     Feeling of Stress : Not on file   Social Connections:     Frequency of Communication with Friends and Family: Not on file    Frequency of Social Gatherings with Friends and Family: Not on file    Attends Sikh Services: Not on file    Active Member of [address] [place] [address] or Organizations: Not on file    Attends Club or Organization Meetings: Not on file    Marital Status: Not on file   Intimate Partner Violence:     Fear of Current or Ex-Partner: Not on file    Emotionally Abused: Not on file    Physically Abused: Not on file    Sexually Abused: Not on file   Housing Stability:     Unable to Pay for Housing in the Last Year: Not on file    Number of [place] in the Last Year: Not on file    Unstable Housing in the Last Year: Not on file     Past Medical History:   Diagnosis Date    Bradycardia     Hypertension     Kidney stones     kidney stones x 2 episodes Past Surgical History:   Procedure Laterality Date    CYSTOSCOPY  07/09/2021    with stent insertion    CYSTOSCOPY Left 7/15/2021    CYSTOSCOPY URETEROSCOPY HOLMIUM LASER WITH STENT CHANGE performed by Phong Graff MD at [address] Bilateral 2010    cataracts     Rubette [facility] Tomasz  12/3/2021     Rubette [facility] Tomasz 12/3/2021 [facility] PROCEDURES    KIDNEY STONE REMOVAL      VASECTOMY       Family History   Problem Relation Age of Onset    High Blood Pressure Mother     Cancer Father         Physical Exam:  Vitals signs and nursing note reviewed. Constitutional:       Appearance: well-developed. HENT:      Head: Normocephalic and atraumatic. Nose: Nose normal.   Eyes:      Conjunctiva/sclera: Conjunctivae normal.   Neck:      Musculoskeletal: Normal range of motion and neck supple. Pulmonary:      Effort: Pulmonary effort is normal. No respiratory distress. Musculoskeletal:      Comments: Normal gait     Skin:     General: Skin is warm and dry. Neurological:      Mental Status: Alert and oriented to person, place, and time. Sensory: No sensory deficit. Psychiatric:         Behavior: Behavior normal.         Thought Content: Thought content normal.        Provider Attestation:  Bora King, personally performed the services described in this documentation. All medical record entries made by the scribe were at my direction and in my presence. I have reviewed the chart and discharge instructions and agree that the records reflect my personal performance and is accurate and complete. Saeid Childress MD 2/1/22     Scribe Attestation:  By signing my name below, Jen oClon attest that this documentation has been prepared under the direction and in the presence of Dr. Get Pickett. Electronically signed: Leroy Beauchamp, 2/1/22     Please note that this chart was generated using voice recognition Dragon dictation software. Although every effort was made to ensure the accuracy of this automated transcription, some errors in transcription may have occurred.

## 2022-02-08 ENCOUNTER — HOSPITAL ENCOUNTER (OUTPATIENT)
Age: 72
Discharge: HOME | End: 2022-02-08
Payer: MEDICARE

## 2022-02-08 DIAGNOSIS — M86.9 OSTEOMYELITIS, UNSPECIFIED SITE, UNSPECIFIED TYPE (HCC): ICD-10-CM

## 2022-02-08 LAB
BUN BLDV-MCNC: 14 MG/DL (ref 8–23)
C-REACTIVE PROTEIN: <3 MG/L (ref 0–5)
CREAT SERPL-MCNC: 1.01 MG/DL (ref 0.7–1.2)
GFR AFRICAN AMERICAN: >60 ML/MIN
GFR NON-AFRICAN AMERICAN: >60 ML/MIN
GFR SERPL CREATININE-BSD FRML MDRD: NORMAL ML/MIN/{1.73_M2}
GFR SERPL CREATININE-BSD FRML MDRD: NORMAL ML/MIN/{1.73_M2}
HCT VFR BLD CALC: 41.9 % (ref 41–53)
HEMOGLOBIN: 14.4 G/DL (ref 13.5–17.5)
MCH RBC QN AUTO: 31 PG (ref 26–34)
MCHC RBC AUTO-ENTMCNC: 34.4 G/DL (ref 31–37)
MCV RBC AUTO: 90.1 FL (ref 80–100)
NRBC AUTOMATED: ABNORMAL PER 100 WBC
PDW BLD-RTO: 15.4 % (ref 11.5–14.9)
PLATELET # BLD: 163 K/UL (ref 150–450)
PMV BLD AUTO: 8.4 FL (ref 6–12)
RBC # BLD: 4.66 M/UL (ref 4.5–5.9)
SEDIMENTATION RATE, ERYTHROCYTE: 3 MM (ref 0–20)
WBC # BLD: 4.9 K/UL (ref 3.5–11)

## 2022-02-08 PROCEDURE — 85027 COMPLETE CBC AUTOMATED: CPT

## 2022-02-08 PROCEDURE — 84520 ASSAY OF UREA NITROGEN: CPT

## 2022-02-08 PROCEDURE — 86140 C-REACTIVE PROTEIN: CPT

## 2022-02-08 PROCEDURE — 82565 ASSAY OF CREATININE: CPT

## 2022-02-08 PROCEDURE — 85652 RBC SED RATE AUTOMATED: CPT

## 2022-02-08 PROCEDURE — 36415 COLL VENOUS BLD VENIPUNCTURE: CPT

## 2022-02-09 ENCOUNTER — OFFICE VISIT (OUTPATIENT)
Dept: INFECTIOUS DISEASES | Age: 72
End: 2022-02-09
Payer: MEDICARE

## 2022-02-09 VITALS
WEIGHT: 225 LBS | RESPIRATION RATE: 17 BRPM | HEART RATE: 70 BPM | DIASTOLIC BLOOD PRESSURE: 76 MMHG | OXYGEN SATURATION: 98 % | SYSTOLIC BLOOD PRESSURE: 136 MMHG | HEIGHT: 70 IN | BODY MASS INDEX: 32.21 KG/M2 | TEMPERATURE: 96.8 F

## 2022-02-09 DIAGNOSIS — M86.9 OSTEOMYELITIS, UNSPECIFIED SITE, UNSPECIFIED TYPE (HCC): Primary | ICD-10-CM

## 2022-02-09 PROCEDURE — 1123F ACP DISCUSS/DSCN MKR DOCD: CPT | Performed by: INTERNAL MEDICINE

## 2022-02-09 PROCEDURE — 3017F COLORECTAL CA SCREEN DOC REV: CPT | Performed by: INTERNAL MEDICINE

## 2022-02-09 PROCEDURE — G8428 CUR MEDS NOT DOCUMENT: HCPCS | Performed by: INTERNAL MEDICINE

## 2022-02-09 PROCEDURE — 4040F PNEUMOC VAC/ADMIN/RCVD: CPT | Performed by: INTERNAL MEDICINE

## 2022-02-09 PROCEDURE — 1036F TOBACCO NON-USER: CPT | Performed by: INTERNAL MEDICINE

## 2022-02-09 PROCEDURE — 99214 OFFICE O/P EST MOD 30 MIN: CPT | Performed by: INTERNAL MEDICINE

## 2022-02-09 PROCEDURE — G8417 CALC BMI ABV UP PARAM F/U: HCPCS | Performed by: INTERNAL MEDICINE

## 2022-02-09 PROCEDURE — G8484 FLU IMMUNIZE NO ADMIN: HCPCS | Performed by: INTERNAL MEDICINE

## 2022-02-09 NOTE — PROGRESS NOTES
REMOVAL      VASECTOMY            Admission Meds  Current Outpatient Medications on File Prior to Visit   Medication Sig Dispense Refill    levothyroxine (SYNTHROID) 175 MCG tablet Take 750 mcg by mouth Daily      levoFLOXacin (LEVAQUIN) 750 MG tablet Take 1 tablet by mouth daily 30 tablet 0    Bacillus Coagulans-Inulin (PROBIOTIC FORMULA) 1-250 BILLION-MG CAPS Take 1 capsule by mouth 2 times daily 60 capsule 2    cephALEXin (KEFLEX) 500 MG capsule Take 1 capsule by mouth 4 times daily Take with food. 112 capsule 1    sulfamethoxazole-trimethoprim (BACTRIM DS;SEPTRA DS) 800-160 MG per tablet Take 1 tablet by mouth 2 times daily Take with food. 56 tablet 0    Probiotic Product (DIFF-STAT) CAPS Take 1 capsule by mouth 2 times daily 60 capsule 3    atenolol (TENORMIN) 25 MG tablet Take 1 tablet by mouth 2 times daily 180 tablet 3    amLODIPine (NORVASC) 5 MG tablet Take 1 tablet by mouth daily 90 tablet 3    acetaminophen (TYLENOL) 500 MG tablet Take 500 mg by mouth every 6 hours as needed for Pain       No current facility-administered medications on file prior to visit. Allergies  No Known Allergies     Social   Social History     Tobacco Use    Smoking status: Never Smoker    Smokeless tobacco: Never Used   Substance Use Topics    Alcohol use: No     Alcohol/week: 0.0 standard drinks     Comment: if any, social occasional               Family History   Problem Relation Age of Onset    High Blood Pressure Mother     Cancer Father           Review of Systems   Other than above 12 systems reviewed were negative .              Physical Exam  /76   Pulse 70   Temp 96.8 °F (36 °C)   Resp 17   Ht 5' 10\" (1.778 m)   Wt 225 lb (102.1 kg)   SpO2 98%   BMI 32.28 kg/m²           General Appearance: alert and oriented to person, place and time, well-developed and well-nourished, in no acute distress  Skin: warm and dry, no rash or erythema  Head: normocephalic and atraumatic  Eyes: pupils equal, round, and reactive to light, extraocular eye movements intact, conjunctivae normal  ENT: hearing grossly normal bilaterally. Neck: neck supple and non tender . Pulmonary/Chest: clear to auscultation bilaterally- no wheezes, rales or rhonchi, normal air movement, no respiratory distress  Cardiovascular: normal rate, regular rhythm, normal S1 and S2, no murmurs.   Abdomen:  non-distended  Extremities: no cyanosis, clubbing or edema  Musculoskeletal: Mild thoracic paraspinal tenderness , normal range of motion, no joint swelling, deformity or tenderness  Neurologic: muscle strength normal    Data Review:    WBC   Date Value Ref Range Status   02/08/2022 4.9 3.5 - 11.0 k/uL Final   12/13/2021 5.8 3.5 - 11.0 k/uL Final   11/17/2021 4.9 3.5 - 11.0 k/uL Final     Hemoglobin   Date Value Ref Range Status   02/08/2022 14.4 13.5 - 17.5 g/dL Final   12/13/2021 13.7 13.5 - 17.5 g/dL Final   11/17/2021 12.8 (L) 13.5 - 17.5 g/dL Final     Hematocrit   Date Value Ref Range Status   02/08/2022 41.9 41 - 53 % Final   12/13/2021 40.2 (L) 41 - 53 % Final   11/17/2021 36.2 (L) 41 - 53 % Final     MCV   Date Value Ref Range Status   02/08/2022 90.1 80 - 100 fL Final   12/13/2021 90.0 80 - 100 fL Final   11/17/2021 90.4 80 - 100 fL Final     Platelets   Date Value Ref Range Status   02/08/2022 163 150 - 450 k/uL Final   12/13/2021 200 150 - 450 k/uL Final   12/03/2021 203 138 - 453 k/uL Final     Sodium   Date Value Ref Range Status   11/02/2021 141 135 - 144 mmol/L Final   07/23/2018 142 135 - 144 mmol/L Final   05/31/2016 140 135 - 144 mmol/L Final     Potassium   Date Value Ref Range Status   11/02/2021 4.0 3.7 - 5.3 mmol/L Final   07/23/2018 4.1 3.7 - 5.3 mmol/L Final   05/31/2016 4.0 3.7 - 5.3 mmol/L Final     Chloride   Date Value Ref Range Status   11/02/2021 105 98 - 107 mmol/L Final   07/23/2018 106 98 - 107 mmol/L Final   05/31/2016 104 98 - 107 mmol/L Final     CO2   Date Value Ref Range Status   11/02/2021 25 20 - 31 There are scattered benign intraosseous hemangiomas which are separate from the aforementioned entity. SPINAL CORD: No abnormal cord signal is seen. SOFT TISSUES:  There is a right pleural effusion. There is right prevertebral/paraspinal enhancing soft tissue adjacent to the T9-T10 disc space. There is also increased enhancement in the right ventral epidural space at T9-T10 which mildly narrows the right ventral thecal sac. No abscess is identified. DEGENERATIVE CHANGES: No significant spinal canal stenosis or neural foraminal narrowing of the thoracic spine. T9-T10 osteomyelitis discitis. Right prevertebral phlegmon and right ventral epidural phlegmon without abscess. RECOMMENDATIONS: Unavailable     MRI LUMBAR SPINE W WO CONTRAST    Result Date: 11/16/2021  EXAMINATION: MRI OF THE LUMBAR SPINE WITHOUT AND WITH CONTRAST  11/16/2021 12:15 pm TECHNIQUE: Multiplanar multisequence MRI of the lumbar spine was performed without and with the administration of intravenous contrast. COMPARISON: CT of the abdomen and pelvis, 11/02/2021. HISTORY: ORDERING SYSTEM PROVIDED HISTORY: Abnormal CT of the abdomen TECHNOLOGIST PROVIDED HISTORY: Additional signs and symptoms: complains of abdominal pain for the past 2 months FINDINGS: BONES/ALIGNMENT:  No fracture or joint dislocation. With respect to the lucent lesion seen on the previous CT along the left lateral L3 vertebral body, it corresponds to a T1 and T2 hyperintense lesion, which mostly suppresses on STIR sequence, likely representing a benign hemangioma. Small T1 and T2 hyperintense foci in the L5, S1, S2 and S3 vertebral bodies likely represent atypical hemangiomas and do not suppress on the STIR sequence. No bony destructive lesion is seen. SPINAL CORD:  The conus terminates normally. SOFT TISSUES: No abnormal enhancement is seen of the lumbar spine. No paraspinal mass identified. L1-L2: Moderate loss of disc height with small disc bulge.   Small disc extrusion along the posterior wall of the L1 vertebral body in the midline. The disc extrusion measures approximately 9 x 4 mm in the axial plane and 18 mm in the craniocaudal plane. It results in mild central canal stenosis. No significant lateral recess or neural foraminal stenoses at this level. L2-L3: Moderate loss of disc height with a disc bulge. Annular tear in the left paracentral aspect of the posterior disc. Mild central canal and lateral recess stenoses. Moderate neural foraminal stenoses. L3-L4: Moderate loss of disc height with a disc bulge. Annular tear in the posterior midline disc. Mild facet and ligamentous hypertrophy. Moderate central canal stenosis, with narrowing of the AP diameter of the thecal sac in the midsagittal plane to 8 mm. Moderate lateral recess and moderate to severe neural foraminal stenoses. L4-L5: Moderate loss of disc height with small disc bulge. Tiny annular tear in the posterior midline disc. Mild facet and ligamentous hypertrophy. Mild central canal and lateral recess stenoses. Moderate to severe neural foraminal stenoses. L5-S1: Small disc bulge. 12 x 7 mm disc extrusion along the posterior L5 vertebral body on the right, tracking cephalad from the disc. It extends to the proximal right neural foramina where it may impinge the right L5 nerve. Mild facet and ligamentous hypertrophy. Mild central canal and lateral recess stenoses. Moderate to severe neural foraminal stenoses. 1. With respect to the lucent lesion identified in the L3 vertebral body on the previous CT, it appears to correspond to a hemangioma. 2. Additional smaller T1 and T2 hyperintense foci in the L5, S1, S2 and S3 vertebral bodies likely represent atypical (lipid poor) hemangioma. 3. No bony destructive lesions seen. 4. Disc herniations at L1-2 and right L5-S1, as described. 5. Moderate central canal stenosis at L3-4.   Mild stenoses at L1-2, L2-3, L4-5 and L5-S1. 6.  Multilevel neural foraminal stenoses, worst (moderate to severe) at L3-4, L4-5 and L5-S1. IR PERC ASPIRATION INTERVERT DISC    Result Date: 12/3/2021  PROCEDURE: PERC ASPIRATION INTERVERT DISC MODERATE CONSCIOUS SEDATION 12/3/2021 HISTORY: ORDERING SYSTEM PROVIDED HISTORY: Back pain, unspecified back location, unspecified back pain laterality, unspecified chronicity TECHNIQUE: Dose modulation, iterative reconstruction, and/or weight based adjustment of the mA/kV was utilized to reduce the radiation dose to as low as reasonably achievable. CONTRAST: None SEDATION: 0 mgversed and 50 micro fentanyl were titrated intravenously for moderate sedation monitored under my direction. Total intraservice time of sedation was 15 minutes. The patient's vital signs were monitored throughout the procedure and recorded in the patient's medical record by the nurse. FLUOROSCOPY DOSE AND TYPE OR TIME AND EXPOSURES: 3.3 minutes, DAP 2997.7 micro gray meter squared DESCRIPTION OF PROCEDURE: Informed consent was obtained after a detailed explanation of the procedure including risks, benefits, and alternatives. Universal protocol was observed. After informed consent patient is placed prone on the table. The T9-T10 disc space was localized on oblique view. Thoracic spine area was prepped and draped sterile fashion. 1% lidocaine was infiltrated for local anesthesia. Under fluoroscopic guidance a 20 gauge Nikolai cutting needle was advanced into the disc space. Aspiration biopsy was performed. Samples collected and sent to pathology for culture sensitivity and cytology. Band-Aid was applied. Patient was returned holding stable condition. FINDINGS: Needle tip confirmed at the T9-T10 level. Successful fluoroscopic guided aspiration biopsy of the T9-T10 disc space with sample sent to lab.                    Assessment:   Thoracic spine discitis/osteomyelitis status post aspiration biopsy 12/3/2021 no culture sent  History of Pseudomonas aeruginosa UTI July 2021/obstructive stone status post stent/stone removal    Recommendations:     Discontinue oral Levaquin and monitor off antibiotics  Follow  C-reactive protein and sedimentation rate in 3 months  Follow-up in 3 months    No orders of the defined types were placed in this encounter. Thank you for allowing me to participate in the care of your patient. Please feel free to contact me with any questions or concerns.      Deysi Dang MD

## 2022-03-02 ENCOUNTER — HOSPITAL ENCOUNTER (OUTPATIENT)
Age: 72
Setting detail: SPECIMEN
Discharge: HOME | End: 2022-03-02

## 2022-03-02 ENCOUNTER — OFFICE VISIT (OUTPATIENT)
Dept: INFECTIOUS DISEASES | Age: 72
End: 2022-03-02
Payer: MEDICARE

## 2022-03-02 VITALS
OXYGEN SATURATION: 99 % | SYSTOLIC BLOOD PRESSURE: 131 MMHG | RESPIRATION RATE: 16 BRPM | TEMPERATURE: 97.2 F | WEIGHT: 225 LBS | HEIGHT: 70 IN | HEART RATE: 72 BPM | DIASTOLIC BLOOD PRESSURE: 72 MMHG | BODY MASS INDEX: 32.21 KG/M2

## 2022-03-02 DIAGNOSIS — N23 URETER COLIC: ICD-10-CM

## 2022-03-02 DIAGNOSIS — N23 URETER COLIC: Primary | ICD-10-CM

## 2022-03-02 LAB
BILIRUBIN URINE: NEGATIVE
BUN BLDV-MCNC: 15 MG/DL (ref 8–23)
COLOR: YELLOW
COMMENT UA: NORMAL
CREAT SERPL-MCNC: 0.91 MG/DL (ref 0.7–1.2)
GFR AFRICAN AMERICAN: >60 ML/MIN
GFR NON-AFRICAN AMERICAN: >60 ML/MIN
GFR SERPL CREATININE-BSD FRML MDRD: NORMAL ML/MIN/{1.73_M2}
GLUCOSE URINE: NEGATIVE
HCT VFR BLD CALC: 43 % (ref 40.7–50.3)
HEMOGLOBIN: 14.5 G/DL (ref 13–17)
KETONES, URINE: NEGATIVE
LEUKOCYTE ESTERASE, URINE: NEGATIVE
MCH RBC QN AUTO: 31.1 PG (ref 25.2–33.5)
MCHC RBC AUTO-ENTMCNC: 33.7 G/DL (ref 28.4–34.8)
MCV RBC AUTO: 92.3 FL (ref 82.6–102.9)
NITRITE, URINE: NEGATIVE
NRBC AUTOMATED: 0 PER 100 WBC
PDW BLD-RTO: 13.6 % (ref 11.8–14.4)
PH UA: 7 (ref 5–8)
PLATELET # BLD: 184 K/UL (ref 138–453)
PMV BLD AUTO: 11.3 FL (ref 8.1–13.5)
PROTEIN UA: NEGATIVE
RBC # BLD: 4.66 M/UL (ref 4.21–5.77)
SPECIFIC GRAVITY UA: 1.02 (ref 1–1.03)
TURBIDITY: CLEAR
URINE HGB: NEGATIVE
UROBILINOGEN, URINE: NORMAL
WBC # BLD: 5.7 K/UL (ref 3.5–11.3)

## 2022-03-02 PROCEDURE — 1123F ACP DISCUSS/DSCN MKR DOCD: CPT | Performed by: INTERNAL MEDICINE

## 2022-03-02 PROCEDURE — G8484 FLU IMMUNIZE NO ADMIN: HCPCS | Performed by: INTERNAL MEDICINE

## 2022-03-02 PROCEDURE — G8428 CUR MEDS NOT DOCUMENT: HCPCS | Performed by: INTERNAL MEDICINE

## 2022-03-02 PROCEDURE — 3017F COLORECTAL CA SCREEN DOC REV: CPT | Performed by: INTERNAL MEDICINE

## 2022-03-02 PROCEDURE — 99213 OFFICE O/P EST LOW 20 MIN: CPT | Performed by: INTERNAL MEDICINE

## 2022-03-02 PROCEDURE — G8417 CALC BMI ABV UP PARAM F/U: HCPCS | Performed by: INTERNAL MEDICINE

## 2022-03-02 PROCEDURE — 1036F TOBACCO NON-USER: CPT | Performed by: INTERNAL MEDICINE

## 2022-03-02 PROCEDURE — 4040F PNEUMOC VAC/ADMIN/RCVD: CPT | Performed by: INTERNAL MEDICINE

## 2022-03-02 RX ORDER — CIPROFLOXACIN 500 MG/1
500 TABLET, FILM COATED ORAL 2 TIMES DAILY
Qty: 60 TABLET | Refills: 0 | Status: SHIPPED | OUTPATIENT
Start: 2022-03-02 | End: 2022-04-01

## 2022-03-02 NOTE — PROGRESS NOTES
Infectious disease Consult Note      Patient: Nonie Hamman  : 1950  Acct#:  [de-identified]     Date:  3/2/2022    Subjective:       History of Present Illness  Patient is a 70 y.o.  male    Chief Complaint   Patient presents with    Follow-up     sick calls feels infection may be  back - chills and fever comes and goes   The patient is complaining of thoracic spine pain for several weeks associated with subjective fever and fatigue  MRI of the thoracic spine 2021 suggestive of T9-T10 osteomyelitis/discitis  The patient had aspiration biopsy done 12/3/2021 apparently no culture was sent, cytology was negative for malignancy  The patient was treated with doxycycline on 2021 that was changed to Bactrim and Keflex on 2021    History of Pseudomonas aeruginosa UTI in 2021 was sensitive to Cipro had kidney stones status post stent/stone removal.  He also had cholelithiasis on CT abdomen that was done 2021  Interval history 22  The patient was started on oral levofloxacin 2021 C-reactive protein less than 3 and sedimentation rate 17  On 2021 sedimentation rate was 39 and C-reactive protein 19.9    Interval history 2022  He is doing better, still have intermittent mild pain to the thoracic spine, denied fever or chills, denied vomiting or diarrhea no other complaints. He is tolerating the oral Levaquin. 2022 sedimentation rate 3, C-reactive protein less than 3  Interval history 3/2/2022  The patient is here for subjective fever, increased thoracic spine pain for several days. The patient also had dysuria around a week ago with left flank pain that has been resolved. He denied cough or shortness of breath, no abdominal pain, no nausea or vomiting, no diarrhea, no other complaints.   Levaquin course was completed 2022  Past Medical History:   Diagnosis Date    Bradycardia     Hypertension     Kidney stones     kidney stones x 2 episodes Past Surgical History:   Procedure Laterality Date    CYSTOSCOPY  07/09/2021    with stent insertion    CYSTOSCOPY Left 7/15/2021    CYSTOSCOPY URETEROSCOPY HOLMIUM LASER WITH STENT CHANGE performed by Fadi Luevano MD at [address] Bilateral 2010    cataracts     Marni Ji [facility] Tomasz  12/3/2021     Marni Ji [facility] Tomasz 12/3/2021 [facility] SPECIAL PROCEDURES    KIDNEY STONE REMOVAL      VASECTOMY            Admission Meds  Current Outpatient Medications on File Prior to Visit   Medication Sig Dispense Refill    levothyroxine (SYNTHROID) 175 MCG tablet Take 750 mcg by mouth Daily      Bacillus Coagulans-Inulin (PROBIOTIC FORMULA) 1-250 BILLION-MG CAPS Take 1 capsule by mouth 2 times daily 60 capsule 2    cephALEXin (KEFLEX) 500 MG capsule Take 1 capsule by mouth 4 times daily Take with food. 112 capsule 1    sulfamethoxazole-trimethoprim (BACTRIM DS;SEPTRA DS) 800-160 MG per tablet Take 1 tablet by mouth 2 times daily Take with food. 56 tablet 0    Probiotic Product (DIFF-STAT) CAPS Take 1 capsule by mouth 2 times daily 60 capsule 3    atenolol (TENORMIN) 25 MG tablet Take 1 tablet by mouth 2 times daily 180 tablet 3    amLODIPine (NORVASC) 5 MG tablet Take 1 tablet by mouth daily 90 tablet 3    acetaminophen (TYLENOL) 500 MG tablet Take 500 mg by mouth every 6 hours as needed for Pain       No current facility-administered medications on file prior to visit. Allergies  No Known Allergies     Social   Social History     Tobacco Use    Smoking status: Never Smoker    Smokeless tobacco: Never Used   Substance Use Topics    Alcohol use: No     Alcohol/week: 0.0 standard drinks     Comment: if any, social occasional               Family History   Problem Relation Age of Onset    High Blood Pressure Mother     Cancer Father           Review of Systems   Other than above 12 systems reviewed were negative .              Physical Exam  /72   Pulse 72 Temp 97.2 °F (36.2 °C)   Resp 16   Ht 5' 10\" (1.778 m)   Wt 225 lb (102.1 kg)   SpO2 99%   BMI 32.28 kg/m²           General Appearance: alert and oriented to person, place and time, well-developed and well-nourished, in no acute distress  Skin: warm and dry, no rash or erythema  Head: normocephalic and atraumatic  Eyes: pupils equal, round, and reactive to light, extraocular eye movements intact, conjunctivae normal  ENT: hearing grossly normal bilaterally. Neck: neck supple and non tender . Pulmonary/Chest: clear to auscultation bilaterally- no wheezes, rales or rhonchi, normal air movement, no respiratory distress  Cardiovascular: normal rate, regular rhythm, normal S1 and S2, no murmurs.   Abdomen:  non-distended  Extremities: no cyanosis, clubbing or edema  Musculoskeletal: Mild thoracic paraspinal tenderness , normal range of motion, no joint swelling, deformity or tenderness  Neurologic: muscle strength normal    Data Review:    WBC   Date Value Ref Range Status   02/08/2022 4.9 3.5 - 11.0 k/uL Final   12/13/2021 5.8 3.5 - 11.0 k/uL Final   11/17/2021 4.9 3.5 - 11.0 k/uL Final     Hemoglobin   Date Value Ref Range Status   02/08/2022 14.4 13.5 - 17.5 g/dL Final   12/13/2021 13.7 13.5 - 17.5 g/dL Final   11/17/2021 12.8 (L) 13.5 - 17.5 g/dL Final     Hematocrit   Date Value Ref Range Status   02/08/2022 41.9 41 - 53 % Final   12/13/2021 40.2 (L) 41 - 53 % Final   11/17/2021 36.2 (L) 41 - 53 % Final     MCV   Date Value Ref Range Status   02/08/2022 90.1 80 - 100 fL Final   12/13/2021 90.0 80 - 100 fL Final   11/17/2021 90.4 80 - 100 fL Final     Platelets   Date Value Ref Range Status   02/08/2022 163 150 - 450 k/uL Final   12/13/2021 200 150 - 450 k/uL Final   12/03/2021 203 138 - 453 k/uL Final     Sodium   Date Value Ref Range Status   11/02/2021 141 135 - 144 mmol/L Final   07/23/2018 142 135 - 144 mmol/L Final   05/31/2016 140 135 - 144 mmol/L Final     Potassium   Date Value Ref Range Status 11/02/2021 4.0 3.7 - 5.3 mmol/L Final   07/23/2018 4.1 3.7 - 5.3 mmol/L Final   05/31/2016 4.0 3.7 - 5.3 mmol/L Final     Chloride   Date Value Ref Range Status   11/02/2021 105 98 - 107 mmol/L Final   07/23/2018 106 98 - 107 mmol/L Final   05/31/2016 104 98 - 107 mmol/L Final     CO2   Date Value Ref Range Status   11/02/2021 25 20 - 31 mmol/L Final   07/23/2018 24 20 - 31 mmol/L Final   05/31/2016 23 20 - 31 mmol/L Final     BUN   Date Value Ref Range Status   02/08/2022 14 8 - 23 mg/dL Final   12/13/2021 17 8 - 23 mg/dL Final   11/02/2021 15 8 - 23 mg/dL Final     CREATININE   Date Value Ref Range Status   02/08/2022 1.01 0.70 - 1.20 mg/dL Final   12/13/2021 0.97 0.70 - 1.20 mg/dL Final   11/02/2021 0.87 0.70 - 1.20 mg/dL Final     AST   Date Value Ref Range Status   11/02/2021 15 <40 U/L Final   05/31/2016 22 <40 U/L Final     ALT   Date Value Ref Range Status   11/02/2021 15 5 - 41 U/L Final   05/31/2016 26 5 - 41 U/L Final     Total Bilirubin   Date Value Ref Range Status   11/02/2021 0.37 0.3 - 1.2 mg/dL Final   05/31/2016 0.47 0.3 - 1.2 mg/dL Final     Alkaline Phosphatase   Date Value Ref Range Status   11/02/2021 105 40 - 129 U/L Final   05/31/2016 89 40 - 129 U/L Final     No results found for: LIPASE, AMYLASE  No results found for: PROTIME, INR  No results found for: PTT  No results found for: OCCULTBLD  No results found for: GLUMET     Imaging Studies:                           All appropriate imaging studies and reports reviewed: Yes  MRI THORACIC SPINE W WO CONTRAST    Result Date: 11/18/2021  EXAMINATION: MRI OF THE THORACIC SPINE WITHOUT AND WITH CONTRAST  11/16/2021 12:15 pm TECHNIQUE: Multiplanar multisequence MRI of the thoracic spine was performed without and with the administration of intravenous contrast. COMPARISON: CT abdomen from 11/02/2021 HISTORY: ORDERING SYSTEM PROVIDED HISTORY: Abnormal CT of the abdomen TECHNOLOGIST PROVIDED HISTORY: Reason for Exam: Abnormal CT of the abdomen R93.5 (ICD-10-CM); Flank pain Additional signs and symptoms: complains of abdominal pain for the past 2 months FINDINGS: BONES/ALIGNMENT: There is T1 hypointense, T2 hyperintense, enhancing intraosseous signal abnormality in the T9 and T10 vertebral bodies centered around the disc space. Vertebral heights are maintained. Alignment is normal.  There are scattered benign intraosseous hemangiomas which are separate from the aforementioned entity. SPINAL CORD: No abnormal cord signal is seen. SOFT TISSUES:  There is a right pleural effusion. There is right prevertebral/paraspinal enhancing soft tissue adjacent to the T9-T10 disc space. There is also increased enhancement in the right ventral epidural space at T9-T10 which mildly narrows the right ventral thecal sac. No abscess is identified. DEGENERATIVE CHANGES: No significant spinal canal stenosis or neural foraminal narrowing of the thoracic spine. T9-T10 osteomyelitis discitis. Right prevertebral phlegmon and right ventral epidural phlegmon without abscess. RECOMMENDATIONS: Unavailable     MRI LUMBAR SPINE W WO CONTRAST    Result Date: 11/16/2021  EXAMINATION: MRI OF THE LUMBAR SPINE WITHOUT AND WITH CONTRAST  11/16/2021 12:15 pm TECHNIQUE: Multiplanar multisequence MRI of the lumbar spine was performed without and with the administration of intravenous contrast. COMPARISON: CT of the abdomen and pelvis, 11/02/2021. HISTORY: ORDERING SYSTEM PROVIDED HISTORY: Abnormal CT of the abdomen TECHNOLOGIST PROVIDED HISTORY: Additional signs and symptoms: complains of abdominal pain for the past 2 months FINDINGS: BONES/ALIGNMENT:  No fracture or joint dislocation. With respect to the lucent lesion seen on the previous CT along the left lateral L3 vertebral body, it corresponds to a T1 and T2 hyperintense lesion, which mostly suppresses on STIR sequence, likely representing a benign hemangioma.   Small T1 and T2 hyperintense foci in the L5, S1, S2 and S3 vertebral bodies likely represent atypical hemangiomas and do not suppress on the STIR sequence. No bony destructive lesion is seen. SPINAL CORD:  The conus terminates normally. SOFT TISSUES: No abnormal enhancement is seen of the lumbar spine. No paraspinal mass identified. L1-L2: Moderate loss of disc height with small disc bulge. Small disc extrusion along the posterior wall of the L1 vertebral body in the midline. The disc extrusion measures approximately 9 x 4 mm in the axial plane and 18 mm in the craniocaudal plane. It results in mild central canal stenosis. No significant lateral recess or neural foraminal stenoses at this level. L2-L3: Moderate loss of disc height with a disc bulge. Annular tear in the left paracentral aspect of the posterior disc. Mild central canal and lateral recess stenoses. Moderate neural foraminal stenoses. L3-L4: Moderate loss of disc height with a disc bulge. Annular tear in the posterior midline disc. Mild facet and ligamentous hypertrophy. Moderate central canal stenosis, with narrowing of the AP diameter of the thecal sac in the midsagittal plane to 8 mm. Moderate lateral recess and moderate to severe neural foraminal stenoses. L4-L5: Moderate loss of disc height with small disc bulge. Tiny annular tear in the posterior midline disc. Mild facet and ligamentous hypertrophy. Mild central canal and lateral recess stenoses. Moderate to severe neural foraminal stenoses. L5-S1: Small disc bulge. 12 x 7 mm disc extrusion along the posterior L5 vertebral body on the right, tracking cephalad from the disc. It extends to the proximal right neural foramina where it may impinge the right L5 nerve. Mild facet and ligamentous hypertrophy. Mild central canal and lateral recess stenoses. Moderate to severe neural foraminal stenoses. 1. With respect to the lucent lesion identified in the L3 vertebral body on the previous CT, it appears to correspond to a hemangioma.  2. Additional smaller T1 and T2 hyperintense foci in the L5, S1, S2 and S3 vertebral bodies likely represent atypical (lipid poor) hemangioma. 3. No bony destructive lesions seen. 4. Disc herniations at L1-2 and right L5-S1, as described. 5. Moderate central canal stenosis at L3-4. Mild stenoses at L1-2, L2-3, L4-5 and L5-S1. 6.  Multilevel neural foraminal stenoses, worst (moderate to severe) at L3-4, L4-5 and L5-S1. IR PERC ASPIRATION INTERVERT DISC    Result Date: 12/3/2021  PROCEDURE: PERC ASPIRATION INTERVERT DISC MODERATE CONSCIOUS SEDATION 12/3/2021 HISTORY: ORDERING SYSTEM PROVIDED HISTORY: Back pain, unspecified back location, unspecified back pain laterality, unspecified chronicity TECHNIQUE: Dose modulation, iterative reconstruction, and/or weight based adjustment of the mA/kV was utilized to reduce the radiation dose to as low as reasonably achievable. CONTRAST: None SEDATION: 0 mgversed and 50 micro fentanyl were titrated intravenously for moderate sedation monitored under my direction. Total intraservice time of sedation was 15 minutes. The patient's vital signs were monitored throughout the procedure and recorded in the patient's medical record by the nurse. FLUOROSCOPY DOSE AND TYPE OR TIME AND EXPOSURES: 3.3 minutes, DAP 2997.7 micro gray meter squared DESCRIPTION OF PROCEDURE: Informed consent was obtained after a detailed explanation of the procedure including risks, benefits, and alternatives. Universal protocol was observed. After informed consent patient is placed prone on the table. The T9-T10 disc space was localized on oblique view. Thoracic spine area was prepped and draped sterile fashion. 1% lidocaine was infiltrated for local anesthesia. Under fluoroscopic guidance a 20 gauge Nikolai cutting needle was advanced into the disc space. Aspiration biopsy was performed. Samples collected and sent to pathology for culture sensitivity and cytology. Band-Aid was applied.   Patient was returned holding stable condition. FINDINGS: Needle tip confirmed at the T9-T10 level. Successful fluoroscopic guided aspiration biopsy of the T9-T10 disc space with sample sent to lab. Assessment:   Thoracic spine discitis/osteomyelitis status post aspiration biopsy 12/3/2021 no culture sent  History of Pseudomonas aeruginosa UTI July 2021/obstructive stone status post stent/stone removal    Recommendations:   P.o. Cipro  UA with reflex to culture  CBC, BUN/creatinine C-reactive protein and sedimentation rate   Follow-up in 1 month    Orders Placed This Encounter   Procedures    CBC     Standing Status:   Future     Standing Expiration Date:   3/2/2023    BUN & Creatinine     Standing Status:   Future     Standing Expiration Date:   3/2/2023    Urinalysis with Reflex to Culture     Standing Status:   Future     Standing Expiration Date:   3/2/2023     Order Specific Question:   SPECIFY(EX-CATH,MIDSTREAM,CYSTO,ETC)? Answer:   midstream         Thank you for allowing me to participate in the care of your patient. Please feel free to contact me with any questions or concerns.      Marcos Vieira MD

## 2022-04-20 ENCOUNTER — TELEPHONE (OUTPATIENT)
Dept: INFECTIOUS DISEASES | Age: 72
End: 2022-04-20

## 2022-04-20 DIAGNOSIS — M46.24 OSTEOMYELITIS OF THORACIC VERTEBRA (HCC): ICD-10-CM

## 2022-04-20 RX ORDER — CEPHALEXIN 500 MG/1
500 CAPSULE ORAL 4 TIMES DAILY
Qty: 120 CAPSULE | Refills: 0 | Status: CANCELLED | OUTPATIENT
Start: 2022-04-20 | End: 2022-05-20

## 2022-04-20 RX ORDER — CIPROFLOXACIN 500 MG/1
500 TABLET, FILM COATED ORAL 2 TIMES DAILY
Qty: 60 TABLET | Refills: 0 | Status: CANCELLED | OUTPATIENT
Start: 2022-04-20 | End: 2022-05-20

## 2022-04-20 NOTE — TELEPHONE ENCOUNTER
Patient called and feels like his infection is back and he wants to know if he can go on an ABX for one more month. Please advise I did a pending order in case.

## 2022-04-20 NOTE — TELEPHONE ENCOUNTER
Pt called back to request the ABX. Pt thinks he has a mild fever. Order is pending for your review and approval. Please sign if you agree. Pt is scheduled for a f/u on 5/11/22.

## 2022-04-20 NOTE — TELEPHONE ENCOUNTER
Spoke to pts wife and informed her of Dr. Sonali Lemus recommendation. Pt was offered a sooner appt with Dr. Nayely Braun, for evaluation, on 5/4/22. Pts wife stated that the pt is concerned about another infection and will go to Urgent Care to be evaluated for possible infection. Pt is scheduled for a f/u with Dr. Nayely Braun on 5/11/22.

## 2022-05-11 ENCOUNTER — OFFICE VISIT (OUTPATIENT)
Dept: INFECTIOUS DISEASES | Age: 72
End: 2022-05-11
Payer: MEDICARE

## 2022-05-11 VITALS
TEMPERATURE: 97.9 F | BODY MASS INDEX: 32.21 KG/M2 | RESPIRATION RATE: 16 BRPM | WEIGHT: 225 LBS | HEIGHT: 70 IN | DIASTOLIC BLOOD PRESSURE: 69 MMHG | HEART RATE: 72 BPM | SYSTOLIC BLOOD PRESSURE: 135 MMHG | OXYGEN SATURATION: 97 %

## 2022-05-11 DIAGNOSIS — M86.9 OSTEOMYELITIS, UNSPECIFIED SITE, UNSPECIFIED TYPE (HCC): Primary | ICD-10-CM

## 2022-05-11 PROCEDURE — 3017F COLORECTAL CA SCREEN DOC REV: CPT | Performed by: INTERNAL MEDICINE

## 2022-05-11 PROCEDURE — G8427 DOCREV CUR MEDS BY ELIG CLIN: HCPCS | Performed by: INTERNAL MEDICINE

## 2022-05-11 PROCEDURE — 4040F PNEUMOC VAC/ADMIN/RCVD: CPT | Performed by: INTERNAL MEDICINE

## 2022-05-11 PROCEDURE — 1123F ACP DISCUSS/DSCN MKR DOCD: CPT | Performed by: INTERNAL MEDICINE

## 2022-05-11 PROCEDURE — 99213 OFFICE O/P EST LOW 20 MIN: CPT | Performed by: INTERNAL MEDICINE

## 2022-05-11 PROCEDURE — 1036F TOBACCO NON-USER: CPT | Performed by: INTERNAL MEDICINE

## 2022-05-11 PROCEDURE — G8417 CALC BMI ABV UP PARAM F/U: HCPCS | Performed by: INTERNAL MEDICINE

## 2022-05-11 RX ORDER — DOXYCYCLINE HYCLATE 100 MG
100 TABLET ORAL 2 TIMES DAILY
Qty: 180 TABLET | Refills: 0 | Status: SHIPPED | OUTPATIENT
Start: 2022-05-11 | End: 2022-08-09

## 2022-05-11 NOTE — PROGRESS NOTES
Infectious disease Consult Note      Patient: Reddy Reese  : 1950  Acct#:  [de-identified]     Date:  2022    Subjective:       History of Present Illness  Patient is a 70 y.o.  male    Chief Complaint   Patient presents with    Follow-up     3 month follow up   The patient is complaining of thoracic spine pain for several weeks associated with subjective fever and fatigue  MRI of the thoracic spine 2021 suggestive of T9-T10 osteomyelitis/discitis  The patient had aspiration biopsy done 12/3/2021 apparently no culture was sent, cytology was negative for malignancy  The patient was treated with doxycycline on 2021 that was changed to Bactrim and Keflex on 2021    History of Pseudomonas aeruginosa UTI in 2021 was sensitive to Cipro had kidney stones status post stent/stone removal.  He also had cholelithiasis on CT abdomen that was done 2021  Interval history 22  The patient was started on oral levofloxacin 2021 C-reactive protein less than 3 and sedimentation rate 17  On 2021 sedimentation rate was 39 and C-reactive protein 19.9    Interval history 2022  He is doing better, still have intermittent mild pain to the thoracic spine, denied fever or chills, denied vomiting or diarrhea no other complaints. He is tolerating the oral Levaquin. 2022 sedimentation rate 3, C-reactive protein less than 3  Interval history 3/2/2022  The patient is here for subjective fever, increased thoracic spine pain for several days. The patient also had dysuria around a week ago with left flank pain that has been resolved. He denied cough or shortness of breath, no abdominal pain, no nausea or vomiting, no diarrhea, no other complaints. Levaquin course was completed 2022 followed by oral Cipro.    2022 interval history  He is feeling well today, denies significant pain, denied fever or chills, no other complaints.   The patient was started on oral Cipro last visit that he tolerated with mild wrist pain for few days initially that was resolved, completed 1 month course and stopped for several days. He noted thoracic back pain and low-grade fever when he was off antibiotics so he started taking p.o. Keflex and the Bactrim that he had at home with improvement of his symptoms,  Past Medical History:   Diagnosis Date    Bradycardia     Hypertension     Kidney stones     kidney stones x 2 episodes      Past Surgical History:   Procedure Laterality Date    CYSTOSCOPY  07/09/2021    with stent insertion    CYSTOSCOPY Left 7/15/2021    CYSTOSCOPY URETEROSCOPY HOLMIUM LASER WITH STENT CHANGE performed by Flor Olguin MD at [address] Bilateral 2010    cataracts     [address] [facility] [address] [facility]  12/3/2021     [facility] [place][note 2] 12/3/2021 [address] [facility] VASECTOMY            Admission Meds  Current Outpatient Medications on File Prior to Visit   Medication Sig Dispense Refill    levothyroxine (SYNTHROID) 175 MCG tablet Take 750 mcg by mouth Daily      Bacillus Coagulans-Inulin (PROBIOTIC FORMULA) 1-250 BILLION-MG CAPS Take 1 capsule by mouth 2 times daily 60 capsule 2    cephALEXin (KEFLEX) 500 MG capsule Take 1 capsule by mouth 4 times daily Take with food. 112 capsule 1    sulfamethoxazole-trimethoprim (BACTRIM DS;SEPTRA DS) 800-160 MG per tablet Take 1 tablet by mouth 2 times daily Take with food. 56 tablet 0    Probiotic Product (DIFF-STAT) CAPS Take 1 capsule by mouth 2 times daily 60 capsule 3    atenolol (TENORMIN) 25 MG tablet Take 1 tablet by mouth 2 times daily 180 tablet 3    amLODIPine (NORVASC) 5 MG tablet Take 1 tablet by mouth daily 90 tablet 3    acetaminophen (TYLENOL) 500 MG tablet Take 500 mg by mouth every 6 hours as needed for Pain       No current facility-administered medications on file prior to visit.            Allergies  No Known Allergies     Social Social History     Tobacco Use    Smoking status: Never Smoker    Smokeless tobacco: Never Used   Substance Use Topics    Alcohol use: No     Alcohol/week: 0.0 standard drinks     Comment: if any, social occasional               Family History   Problem Relation Age of Onset    High Blood Pressure Mother     Cancer Father           Review of Systems   Other than above 12 systems reviewed were negative . Physical Exam  /69   Pulse 72   Temp 97.9 °F (36.6 °C)   Resp 16   Ht 5' 10\" (1.778 m)   Wt 225 lb (102.1 kg)   SpO2 97%   BMI 32.28 kg/m²           General Appearance: alert and oriented to person, place and time, well-developed and well-nourished, in no acute distress  Skin: warm and dry, no rash or erythema  Head: normocephalic and atraumatic  Eyes: pupils equal, round, conjunctivae normal  ENT: hearing grossly normal bilaterally. Neck: neck supple and non tender .   Pulmonary/Chest:  normal air movement, no respiratory distress  Abdomen:  non-distended  Extremities: no cyanosis, clubbing or edema  Musculoskeletal: No spinal tenderness, normal range of motion, no joint swelling, deformity or tenderness  Neurologic: muscle strength normal    Data Review:    WBC   Date Value Ref Range Status   03/02/2022 5.7 3.5 - 11.3 k/uL Final   02/08/2022 4.9 3.5 - 11.0 k/uL Final   12/13/2021 5.8 3.5 - 11.0 k/uL Final     Hemoglobin   Date Value Ref Range Status   03/02/2022 14.5 13.0 - 17.0 g/dL Final   02/08/2022 14.4 13.5 - 17.5 g/dL Final   12/13/2021 13.7 13.5 - 17.5 g/dL Final     Hematocrit   Date Value Ref Range Status   03/02/2022 43.0 40.7 - 50.3 % Final   02/08/2022 41.9 41 - 53 % Final   12/13/2021 40.2 (L) 41 - 53 % Final     MCV   Date Value Ref Range Status   03/02/2022 92.3 82.6 - 102.9 fL Final   02/08/2022 90.1 80 - 100 fL Final   12/13/2021 90.0 80 - 100 fL Final     Platelets   Date Value Ref Range Status   03/02/2022 184 138 - 453 k/uL Final   02/08/2022 163 150 - 450 k/uL Final   12/13/2021 200 150 - 450 k/uL Final     Sodium   Date Value Ref Range Status   11/02/2021 141 135 - 144 mmol/L Final   07/23/2018 142 135 - 144 mmol/L Final   05/31/2016 140 135 - 144 mmol/L Final     Potassium   Date Value Ref Range Status   11/02/2021 4.0 3.7 - 5.3 mmol/L Final   07/23/2018 4.1 3.7 - 5.3 mmol/L Final   05/31/2016 4.0 3.7 - 5.3 mmol/L Final     Chloride   Date Value Ref Range Status   11/02/2021 105 98 - 107 mmol/L Final   07/23/2018 106 98 - 107 mmol/L Final   05/31/2016 104 98 - 107 mmol/L Final     CO2   Date Value Ref Range Status   11/02/2021 25 20 - 31 mmol/L Final   07/23/2018 24 20 - 31 mmol/L Final   05/31/2016 23 20 - 31 mmol/L Final     BUN   Date Value Ref Range Status   03/02/2022 15 8 - 23 mg/dL Final   02/08/2022 14 8 - 23 mg/dL Final   12/13/2021 17 8 - 23 mg/dL Final     CREATININE   Date Value Ref Range Status   03/02/2022 0.91 0.70 - 1.20 mg/dL Final   02/08/2022 1.01 0.70 - 1.20 mg/dL Final   12/13/2021 0.97 0.70 - 1.20 mg/dL Final     AST   Date Value Ref Range Status   11/02/2021 15 <40 U/L Final   05/31/2016 22 <40 U/L Final     ALT   Date Value Ref Range Status   11/02/2021 15 5 - 41 U/L Final   05/31/2016 26 5 - 41 U/L Final     Total Bilirubin   Date Value Ref Range Status   11/02/2021 0.37 0.3 - 1.2 mg/dL Final   05/31/2016 0.47 0.3 - 1.2 mg/dL Final     Alkaline Phosphatase   Date Value Ref Range Status   11/02/2021 105 40 - 129 U/L Final   05/31/2016 89 40 - 129 U/L Final     No results found for: LIPASE, AMYLASE  No results found for: PROTIME, INR  No results found for: PTT  No results found for: OCCULTBLD  No results found for: GLUMET     Imaging Studies:                           All appropriate imaging studies and reports reviewed: Yes  MRI THORACIC SPINE W WO CONTRAST    Result Date: 11/18/2021  EXAMINATION: MRI OF THE THORACIC SPINE WITHOUT AND WITH CONTRAST  11/16/2021 12:15 pm TECHNIQUE: Multiplanar multisequence MRI of the thoracic spine was performed without and with the administration of intravenous contrast. COMPARISON: CT abdomen from 11/02/2021 HISTORY: ORDERING SYSTEM PROVIDED HISTORY: Abnormal CT of the abdomen TECHNOLOGIST PROVIDED HISTORY: Reason for Exam: Abnormal CT of the abdomen R93.5 (ICD-10-CM); Flank pain Additional signs and symptoms: complains of abdominal pain for the past 2 months FINDINGS: BONES/ALIGNMENT: There is T1 hypointense, T2 hyperintense, enhancing intraosseous signal abnormality in the T9 and T10 vertebral bodies centered around the disc space. Vertebral heights are maintained. Alignment is normal.  There are scattered benign intraosseous hemangiomas which are separate from the aforementioned entity. SPINAL CORD: No abnormal cord signal is seen. SOFT TISSUES:  There is a right pleural effusion. There is right prevertebral/paraspinal enhancing soft tissue adjacent to the T9-T10 disc space. There is also increased enhancement in the right ventral epidural space at T9-T10 which mildly narrows the right ventral thecal sac. No abscess is identified. DEGENERATIVE CHANGES: No significant spinal canal stenosis or neural foraminal narrowing of the thoracic spine. T9-T10 osteomyelitis discitis. Right prevertebral phlegmon and right ventral epidural phlegmon without abscess. RECOMMENDATIONS: Unavailable     MRI LUMBAR SPINE W WO CONTRAST    Result Date: 11/16/2021  EXAMINATION: MRI OF THE LUMBAR SPINE WITHOUT AND WITH CONTRAST  11/16/2021 12:15 pm TECHNIQUE: Multiplanar multisequence MRI of the lumbar spine was performed without and with the administration of intravenous contrast. COMPARISON: CT of the abdomen and pelvis, 11/02/2021. HISTORY: ORDERING SYSTEM PROVIDED HISTORY: Abnormal CT of the abdomen TECHNOLOGIST PROVIDED HISTORY: Additional signs and symptoms: complains of abdominal pain for the past 2 months FINDINGS: BONES/ALIGNMENT:  No fracture or joint dislocation.   With respect to the lucent lesion seen on the previous CT along the left lateral L3 vertebral body, it corresponds to a T1 and T2 hyperintense lesion, which mostly suppresses on STIR sequence, likely representing a benign hemangioma. Small T1 and T2 hyperintense foci in the L5, S1, S2 and S3 vertebral bodies likely represent atypical hemangiomas and do not suppress on the STIR sequence. No bony destructive lesion is seen. SPINAL CORD:  The conus terminates normally. SOFT TISSUES: No abnormal enhancement is seen of the lumbar spine. No paraspinal mass identified. L1-L2: Moderate loss of disc height with small disc bulge. Small disc extrusion along the posterior wall of the L1 vertebral body in the midline. The disc extrusion measures approximately 9 x 4 mm in the axial plane and 18 mm in the craniocaudal plane. It results in mild central canal stenosis. No significant lateral recess or neural foraminal stenoses at this level. L2-L3: Moderate loss of disc height with a disc bulge. Annular tear in the left paracentral aspect of the posterior disc. Mild central canal and lateral recess stenoses. Moderate neural foraminal stenoses. L3-L4: Moderate loss of disc height with a disc bulge. Annular tear in the posterior midline disc. Mild facet and ligamentous hypertrophy. Moderate central canal stenosis, with narrowing of the AP diameter of the thecal sac in the midsagittal plane to 8 mm. Moderate lateral recess and moderate to severe neural foraminal stenoses. L4-L5: Moderate loss of disc height with small disc bulge. Tiny annular tear in the posterior midline disc. Mild facet and ligamentous hypertrophy. Mild central canal and lateral recess stenoses. Moderate to severe neural foraminal stenoses. L5-S1: Small disc bulge. 12 x 7 mm disc extrusion along the posterior L5 vertebral body on the right, tracking cephalad from the disc. It extends to the proximal right neural foramina where it may impinge the right L5 nerve. Mild facet and ligamentous hypertrophy. Mild central canal and lateral recess stenoses. Moderate to severe neural foraminal stenoses. 1. With respect to the lucent lesion identified in the L3 vertebral body on the previous CT, it appears to correspond to a hemangioma. 2. Additional smaller T1 and T2 hyperintense foci in the L5, S1, S2 and S3 vertebral bodies likely represent atypical (lipid poor) hemangioma. 3. No bony destructive lesions seen. 4. Disc herniations at L1-2 and right L5-S1, as described. 5. Moderate central canal stenosis at L3-4. Mild stenoses at L1-2, L2-3, L4-5 and L5-S1. 6.  Multilevel neural foraminal stenoses, worst (moderate to severe) at L3-4, L4-5 and L5-S1. IR PERC ASPIRATION INTERVERT DISC    Result Date: 12/3/2021  PROCEDURE: PERC ASPIRATION INTERVERT DISC MODERATE CONSCIOUS SEDATION 12/3/2021 HISTORY: ORDERING SYSTEM PROVIDED HISTORY: Back pain, unspecified back location, unspecified back pain laterality, unspecified chronicity TECHNIQUE: Dose modulation, iterative reconstruction, and/or weight based adjustment of the mA/kV was utilized to reduce the radiation dose to as low as reasonably achievable. CONTRAST: None SEDATION: 0 mgversed and 50 micro fentanyl were titrated intravenously for moderate sedation monitored under my direction. Total intraservice time of sedation was 15 minutes. The patient's vital signs were monitored throughout the procedure and recorded in the patient's medical record by the nurse. FLUOROSCOPY DOSE AND TYPE OR TIME AND EXPOSURES: 3.3 minutes, DAP 2997.7 micro gray meter squared DESCRIPTION OF PROCEDURE: Informed consent was obtained after a detailed explanation of the procedure including risks, benefits, and alternatives. Universal protocol was observed. After informed consent patient is placed prone on the table. The T9-T10 disc space was localized on oblique view. Thoracic spine area was prepped and draped sterile fashion. 1% lidocaine was infiltrated for local anesthesia.  Under fluoroscopic guidance a 20 gauge Nikolai cutting needle was advanced into the disc space. Aspiration biopsy was performed. Samples collected and sent to pathology for culture sensitivity and cytology. Band-Aid was applied. Patient was returned holding stable condition. FINDINGS: Needle tip confirmed at the T9-T10 level. Successful fluoroscopic guided aspiration biopsy of the T9-T10 disc space with sample sent to lab. Assessment:   Thoracic spine discitis/osteomyelitis status post aspiration biopsy 12/3/2021 no culture sent  History of Pseudomonas aeruginosa UTI July 2021/obstructive stone status post stent/stone removal    Recommendations:   Long discussion with the patient regarding his care. P.o. doxycycline 100 mg twice daily for 3 months. CBC, BUN/creatinine C-reactive protein and sedimentation rate   Follow-up in 3 months    Orders Placed This Encounter   Procedures    C-Reactive Protein     Standing Status:   Future     Standing Expiration Date:   5/11/2023    Sedimentation Rate     Standing Status:   Future     Standing Expiration Date:   5/11/2023    BUN & Creatinine     Standing Status:   Future     Standing Expiration Date:   5/11/2023    CBC     Standing Status:   Future     Standing Expiration Date:   5/11/2023         Thank you for allowing me to participate in the care of your patient. Please feel free to contact me with any questions or concerns.      Danica Campo MD

## 2022-08-17 ENCOUNTER — HOSPITAL ENCOUNTER (OUTPATIENT)
Age: 72
Setting detail: SPECIMEN
Discharge: HOME | End: 2022-08-17

## 2022-08-17 ENCOUNTER — OFFICE VISIT (OUTPATIENT)
Dept: INFECTIOUS DISEASES | Age: 72
End: 2022-08-17
Payer: MEDICARE

## 2022-08-17 VITALS
HEIGHT: 70 IN | HEART RATE: 69 BPM | OXYGEN SATURATION: 97 % | DIASTOLIC BLOOD PRESSURE: 84 MMHG | WEIGHT: 225 LBS | RESPIRATION RATE: 16 BRPM | SYSTOLIC BLOOD PRESSURE: 134 MMHG | BODY MASS INDEX: 32.21 KG/M2 | TEMPERATURE: 97.6 F

## 2022-08-17 DIAGNOSIS — M86.9 OSTEOMYELITIS, UNSPECIFIED SITE, UNSPECIFIED TYPE (HCC): ICD-10-CM

## 2022-08-17 DIAGNOSIS — M86.9 OSTEOMYELITIS, UNSPECIFIED SITE, UNSPECIFIED TYPE (HCC): Primary | ICD-10-CM

## 2022-08-17 LAB
BUN BLDV-MCNC: 18 MG/DL (ref 8–23)
C-REACTIVE PROTEIN: 4.6 MG/L (ref 0–5)
CREAT SERPL-MCNC: 1.15 MG/DL (ref 0.7–1.2)
GFR AFRICAN AMERICAN: >60 ML/MIN
GFR NON-AFRICAN AMERICAN: >60 ML/MIN
GFR SERPL CREATININE-BSD FRML MDRD: NORMAL ML/MIN/{1.73_M2}
HCT VFR BLD CALC: 42.2 % (ref 40.7–50.3)
HEMOGLOBIN: 13.3 G/DL (ref 13–17)
MCH RBC QN AUTO: 31.8 PG (ref 25.2–33.5)
MCHC RBC AUTO-ENTMCNC: 31.5 G/DL (ref 28.4–34.8)
MCV RBC AUTO: 101 FL (ref 82.6–102.9)
NRBC AUTOMATED: 0 PER 100 WBC
PDW BLD-RTO: 13.5 % (ref 11.8–14.4)
PLATELET # BLD: 175 K/UL (ref 138–453)
PMV BLD AUTO: 11.7 FL (ref 8.1–13.5)
RBC # BLD: 4.18 M/UL (ref 4.21–5.77)
SEDIMENTATION RATE, ERYTHROCYTE: 19 MM/HR (ref 0–20)
WBC # BLD: 6.7 K/UL (ref 3.5–11.3)

## 2022-08-17 PROCEDURE — 3017F COLORECTAL CA SCREEN DOC REV: CPT | Performed by: INTERNAL MEDICINE

## 2022-08-17 PROCEDURE — 1123F ACP DISCUSS/DSCN MKR DOCD: CPT | Performed by: INTERNAL MEDICINE

## 2022-08-17 PROCEDURE — 99213 OFFICE O/P EST LOW 20 MIN: CPT | Performed by: INTERNAL MEDICINE

## 2022-08-17 PROCEDURE — G8417 CALC BMI ABV UP PARAM F/U: HCPCS | Performed by: INTERNAL MEDICINE

## 2022-08-17 PROCEDURE — 1036F TOBACCO NON-USER: CPT | Performed by: INTERNAL MEDICINE

## 2022-08-17 PROCEDURE — G8427 DOCREV CUR MEDS BY ELIG CLIN: HCPCS | Performed by: INTERNAL MEDICINE

## 2022-08-17 NOTE — PROGRESS NOTES
Infectious disease Consult Note      Patient: Sushila Ruiz  : 1950  Acct#:  [de-identified]     Date:  2022    Subjective:       History of Present Illness  Patient is a 70 y.o.  male    Chief Complaint   Patient presents with    Osteomyelitis      3 month follow up   The patient is complaining of thoracic spine pain for several weeks associated with subjective fever and fatigue  MRI of the thoracic spine 2021 suggestive of T9-T10 osteomyelitis/discitis  The patient had aspiration biopsy done 12/3/2021 apparently no culture was sent, cytology was negative for malignancy  The patient was treated with doxycycline on 2021 that was changed to Bactrim and Keflex on 2021    History of Pseudomonas aeruginosa UTI in 2021 was sensitive to Cipro had kidney stones status post stent/stone removal.  He also had cholelithiasis on CT abdomen that was done 2021  Interval history 22  The patient was started on oral levofloxacin 2021 C-reactive protein less than 3 and sedimentation rate 17  On 2021 sedimentation rate was 39 and C-reactive protein 19.9    Interval history 2022  He is doing better, still have intermittent mild pain to the thoracic spine, denied fever or chills, denied vomiting or diarrhea no other complaints. He is tolerating the oral Levaquin. 2022 sedimentation rate 3, C-reactive protein less than 3  Interval history 3/2/2022  The patient is here for subjective fever, increased thoracic spine pain for several days. The patient also had dysuria around a week ago with left flank pain that has been resolved. Levaquin course was completed 2022 followed by oral Cipro.    2022 interval history  He is feeling well today, denies significant pain, denied fever or chills, no other complaints.   The patient was started on oral Cipro last visit that he tolerated with mild wrist pain for few days initially that was resolved, completed 1 month course and stopped for several days. He noted thoracic back pain and low-grade fever when he was off antibiotics so he started taking p.o. Keflex and the Bactrim that he had at home with improvement of his symptoms. Interval history 8/17/2022  He is feeling well today, denied fever or chills, denied nausea or vomiting, no back pain, no other complaints. He finished a course of oral doxycycline 1 week ago. C-reactive protein, sedimentation rate drawn today, no results available yet. Past Medical History:   Diagnosis Date    Bradycardia     Hypertension     Kidney stones     kidney stones x 2 episodes      Past Surgical History:   Procedure Laterality Date    CYSTOSCOPY  07/09/2021    with stent insertion    CYSTOSCOPY Left 7/15/2021    CYSTOSCOPY URETEROSCOPY HOLMIUM LASER WITH STENT CHANGE performed by Diane Titus MD at [address] Bilateral 2010    cataracts     [facility]  12/3/2021     [facility] [address] [facility] 12/3/2021 [facility] PROCEDURES    KIDNEY STONE REMOVAL      VASECTOMY            Admission Meds  Current Outpatient Medications on File Prior to Visit   Medication Sig Dispense Refill    levothyroxine (SYNTHROID) 175 MCG tablet Take 750 mcg by mouth Daily      Bacillus Coagulans-Inulin (PROBIOTIC FORMULA) 1-250 BILLION-MG CAPS Take 1 capsule by mouth 2 times daily 60 capsule 2    cephALEXin (KEFLEX) 500 MG capsule Take 1 capsule by mouth 4 times daily Take with food. 112 capsule 1    sulfamethoxazole-trimethoprim (BACTRIM DS;SEPTRA DS) 800-160 MG per tablet Take 1 tablet by mouth 2 times daily Take with food.  56 tablet 0    Probiotic Product (DIFF-STAT) CAPS Take 1 capsule by mouth 2 times daily 60 capsule 3    atenolol (TENORMIN) 25 MG tablet Take 1 tablet by mouth 2 times daily 180 tablet 3    amLODIPine (NORVASC) 5 MG tablet Take 1 tablet by mouth daily 90 tablet 3    acetaminophen (TYLENOL) 500 MG tablet Take 500 mg by mouth every 6 hours as needed for Pain       No current facility-administered medications on file prior to visit. Allergies  No Known Allergies     Social   Social History     Tobacco Use    Smoking status: Never    Smokeless tobacco: Never   Substance Use Topics    Alcohol use: No     Alcohol/week: 0.0 standard drinks     Comment: if any, social occasional               Family History   Problem Relation Age of Onset    High Blood Pressure Mother     Cancer Father           Review of Systems   Other than above 12 systems reviewed were negative . Physical Exam  /84   Pulse 69   Temp 97.6 °F (36.4 °C)   Resp 16   Ht 5' 10\" (1.778 m)   Wt 225 lb (102.1 kg)   SpO2 97%   BMI 32.28 kg/m²           General Appearance: alert and oriented to person, place and time, well-developed and well-nourished, in no acute distress  Skin: warm and dry, no rash or erythema  Head: normocephalic and atraumatic  Eyes: pupils equal, round, conjunctivae normal  ENT: hearing grossly normal bilaterally. Neck: neck supple and non tender .   Pulmonary/Chest:  normal air movement, no respiratory distress  Abdomen:  non-distended  Extremities: no cyanosis, clubbing or edema  Musculoskeletal: No spinal tenderness, normal range of motion, no joint swelling, deformity or tenderness  Neurologic: muscle strength normal    Data Review:    WBC   Date Value Ref Range Status   03/02/2022 5.7 3.5 - 11.3 k/uL Final   02/08/2022 4.9 3.5 - 11.0 k/uL Final   12/13/2021 5.8 3.5 - 11.0 k/uL Final     Hemoglobin   Date Value Ref Range Status   03/02/2022 14.5 13.0 - 17.0 g/dL Final   02/08/2022 14.4 13.5 - 17.5 g/dL Final   12/13/2021 13.7 13.5 - 17.5 g/dL Final     Hematocrit   Date Value Ref Range Status   03/02/2022 43.0 40.7 - 50.3 % Final   02/08/2022 41.9 41 - 53 % Final   12/13/2021 40.2 (L) 41 - 53 % Final     MCV   Date Value Ref Range Status   03/02/2022 92.3 82.6 - 102.9 fL Final   02/08/2022 90.1 80 - 100 fL Final   12/13/2021 90.0 80 - 100 fL Final     Platelets   Date Value Ref Range Status   03/02/2022 184 138 - 453 k/uL Final   02/08/2022 163 150 - 450 k/uL Final   12/13/2021 200 150 - 450 k/uL Final     Sodium   Date Value Ref Range Status   11/02/2021 141 135 - 144 mmol/L Final   07/23/2018 142 135 - 144 mmol/L Final   05/31/2016 140 135 - 144 mmol/L Final     Potassium   Date Value Ref Range Status   11/02/2021 4.0 3.7 - 5.3 mmol/L Final   07/23/2018 4.1 3.7 - 5.3 mmol/L Final   05/31/2016 4.0 3.7 - 5.3 mmol/L Final     Chloride   Date Value Ref Range Status   11/02/2021 105 98 - 107 mmol/L Final   07/23/2018 106 98 - 107 mmol/L Final   05/31/2016 104 98 - 107 mmol/L Final     CO2   Date Value Ref Range Status   11/02/2021 25 20 - 31 mmol/L Final   07/23/2018 24 20 - 31 mmol/L Final   05/31/2016 23 20 - 31 mmol/L Final     BUN   Date Value Ref Range Status   03/02/2022 15 8 - 23 mg/dL Final   02/08/2022 14 8 - 23 mg/dL Final   12/13/2021 17 8 - 23 mg/dL Final     Creatinine   Date Value Ref Range Status   03/02/2022 0.91 0.70 - 1.20 mg/dL Final   02/08/2022 1.01 0.70 - 1.20 mg/dL Final   12/13/2021 0.97 0.70 - 1.20 mg/dL Final     AST   Date Value Ref Range Status   11/02/2021 15 <40 U/L Final   05/31/2016 22 <40 U/L Final     ALT   Date Value Ref Range Status   11/02/2021 15 5 - 41 U/L Final   05/31/2016 26 5 - 41 U/L Final     Total Bilirubin   Date Value Ref Range Status   11/02/2021 0.37 0.3 - 1.2 mg/dL Final   05/31/2016 0.47 0.3 - 1.2 mg/dL Final     Alkaline Phosphatase   Date Value Ref Range Status   11/02/2021 105 40 - 129 U/L Final   05/31/2016 89 40 - 129 U/L Final     No results found for: LIPASE, AMYLASE  No results found for: PROTIME, INR  No results found for: PTT  No results found for: OCCULTBLD  No results found for: GLUMET     Imaging Studies:                           All appropriate imaging studies and reports reviewed: Yes  MRI THORACIC SPINE W WO CONTRAST    Result Date: 11/18/2021  EXAMINATION: MRI OF THE THORACIC SPINE WITHOUT AND WITH CONTRAST  11/16/2021 12:15 pm TECHNIQUE: Multiplanar multisequence MRI of the thoracic spine was performed without and with the administration of intravenous contrast. COMPARISON: CT abdomen from 11/02/2021 HISTORY: ORDERING SYSTEM PROVIDED HISTORY: Abnormal CT of the abdomen TECHNOLOGIST PROVIDED HISTORY: Reason for Exam: Abnormal CT of the abdomen R93.5 (ICD-10-CM); Flank pain Additional signs and symptoms: complains of abdominal pain for the past 2 months FINDINGS: BONES/ALIGNMENT: There is T1 hypointense, T2 hyperintense, enhancing intraosseous signal abnormality in the T9 and T10 vertebral bodies centered around the disc space. Vertebral heights are maintained. Alignment is normal.  There are scattered benign intraosseous hemangiomas which are separate from the aforementioned entity. SPINAL CORD: No abnormal cord signal is seen. SOFT TISSUES:  There is a right pleural effusion. There is right prevertebral/paraspinal enhancing soft tissue adjacent to the T9-T10 disc space. There is also increased enhancement in the right ventral epidural space at T9-T10 which mildly narrows the right ventral thecal sac. No abscess is identified. DEGENERATIVE CHANGES: No significant spinal canal stenosis or neural foraminal narrowing of the thoracic spine. T9-T10 osteomyelitis discitis. Right prevertebral phlegmon and right ventral epidural phlegmon without abscess. RECOMMENDATIONS: Unavailable     MRI LUMBAR SPINE W WO CONTRAST    Result Date: 11/16/2021  EXAMINATION: MRI OF THE LUMBAR SPINE WITHOUT AND WITH CONTRAST  11/16/2021 12:15 pm TECHNIQUE: Multiplanar multisequence MRI of the lumbar spine was performed without and with the administration of intravenous contrast. COMPARISON: CT of the abdomen and pelvis, 11/02/2021.  HISTORY: ORDERING SYSTEM PROVIDED HISTORY: Abnormal CT of the abdomen TECHNOLOGIST PROVIDED HISTORY: Additional signs and symptoms: complains of abdominal pain for the past 2 months FINDINGS: BONES/ALIGNMENT:  No fracture or joint dislocation. With respect to the lucent lesion seen on the previous CT along the left lateral L3 vertebral body, it corresponds to a T1 and T2 hyperintense lesion, which mostly suppresses on STIR sequence, likely representing a benign hemangioma. Small T1 and T2 hyperintense foci in the L5, S1, S2 and S3 vertebral bodies likely represent atypical hemangiomas and do not suppress on the STIR sequence. No bony destructive lesion is seen. SPINAL CORD:  The conus terminates normally. SOFT TISSUES: No abnormal enhancement is seen of the lumbar spine. No paraspinal mass identified. L1-L2: Moderate loss of disc height with small disc bulge. Small disc extrusion along the posterior wall of the L1 vertebral body in the midline. The disc extrusion measures approximately 9 x 4 mm in the axial plane and 18 mm in the craniocaudal plane. It results in mild central canal stenosis. No significant lateral recess or neural foraminal stenoses at this level. L2-L3: Moderate loss of disc height with a disc bulge. Annular tear in the left paracentral aspect of the posterior disc. Mild central canal and lateral recess stenoses. Moderate neural foraminal stenoses. L3-L4: Moderate loss of disc height with a disc bulge. Annular tear in the posterior midline disc. Mild facet and ligamentous hypertrophy. Moderate central canal stenosis, with narrowing of the AP diameter of the thecal sac in the midsagittal plane to 8 mm. Moderate lateral recess and moderate to severe neural foraminal stenoses. L4-L5: Moderate loss of disc height with small disc bulge. Tiny annular tear in the posterior midline disc. Mild facet and ligamentous hypertrophy. Mild central canal and lateral recess stenoses. Moderate to severe neural foraminal stenoses. L5-S1: Small disc bulge. 12 x 7 mm disc extrusion along the posterior L5 vertebral body on the right, tracking cephalad from the disc.   It extends to the proximal right neural foramina where it may impinge the right L5 nerve. Mild facet and ligamentous hypertrophy. Mild central canal and lateral recess stenoses. Moderate to severe neural foraminal stenoses. 1. With respect to the lucent lesion identified in the L3 vertebral body on the previous CT, it appears to correspond to a hemangioma. 2. Additional smaller T1 and T2 hyperintense foci in the L5, S1, S2 and S3 vertebral bodies likely represent atypical (lipid poor) hemangioma. 3. No bony destructive lesions seen. 4. Disc herniations at L1-2 and right L5-S1, as described. 5. Moderate central canal stenosis at L3-4. Mild stenoses at L1-2, L2-3, L4-5 and L5-S1. 6.  Multilevel neural foraminal stenoses, worst (moderate to severe) at L3-4, L4-5 and L5-S1. IR PERC ASPIRATION INTERVERT DISC    Result Date: 12/3/2021  PROCEDURE: PERC ASPIRATION INTERVERT DISC MODERATE CONSCIOUS SEDATION 12/3/2021 HISTORY: ORDERING SYSTEM PROVIDED HISTORY: Back pain, unspecified back location, unspecified back pain laterality, unspecified chronicity TECHNIQUE: Dose modulation, iterative reconstruction, and/or weight based adjustment of the mA/kV was utilized to reduce the radiation dose to as low as reasonably achievable. CONTRAST: None SEDATION: 0 mgversed and 50 micro fentanyl were titrated intravenously for moderate sedation monitored under my direction. Total intraservice time of sedation was 15 minutes. The patient's vital signs were monitored throughout the procedure and recorded in the patient's medical record by the nurse. FLUOROSCOPY DOSE AND TYPE OR TIME AND EXPOSURES: 3.3 minutes, DAP 2997.7 micro gray meter squared DESCRIPTION OF PROCEDURE: Informed consent was obtained after a detailed explanation of the procedure including risks, benefits, and alternatives. Universal protocol was observed. After informed consent patient is placed prone on the table.   The T9-T10 disc space was localized on oblique view.  Thoracic spine area was prepped and draped sterile fashion. 1% lidocaine was infiltrated for local anesthesia. Under fluoroscopic guidance a 20 gauge Nikolai cutting needle was advanced into the disc space. Aspiration biopsy was performed. Samples collected and sent to pathology for culture sensitivity and cytology. Band-Aid was applied. Patient was returned holding stable condition. FINDINGS: Needle tip confirmed at the T9-T10 level. Successful fluoroscopic guided aspiration biopsy of the T9-T10 disc space with sample sent to lab. Assessment:   Thoracic spine discitis/osteomyelitis status post aspiration biopsy 12/3/2021 no culture sent  History of Pseudomonas aeruginosa UTI July 2021/obstructive stone status post stent/stone removal    Recommendations:   Monitor off antibiotics. Follow C-reactive protein and sedimentation rate   Follow-up in 2 months    Orders Placed This Encounter   Procedures    C-reactive protein     Standing Status:   Future     Standing Expiration Date:   8/17/2023    Sedimentation Rate     Standing Status:   Future     Standing Expiration Date:   8/17/2023         Thank you for allowing me to participate in the care of your patient. Please feel free to contact me with any questions or concerns.      Lois Colon MD

## 2022-10-22 DIAGNOSIS — I10 ESSENTIAL HYPERTENSION: ICD-10-CM

## 2022-10-24 RX ORDER — AMLODIPINE BESYLATE 5 MG/1
TABLET ORAL
Qty: 90 TABLET | Refills: 0 | Status: SHIPPED | OUTPATIENT
Start: 2022-10-24

## 2022-10-24 RX ORDER — ATENOLOL 25 MG/1
TABLET ORAL
Qty: 180 TABLET | Refills: 0 | Status: SHIPPED | OUTPATIENT
Start: 2022-10-24

## 2022-12-05 ENCOUNTER — OFFICE VISIT (OUTPATIENT)
Dept: PRIMARY CARE CLINIC | Age: 72
End: 2022-12-05
Payer: MEDICARE

## 2022-12-05 VITALS
HEIGHT: 70 IN | HEART RATE: 65 BPM | SYSTOLIC BLOOD PRESSURE: 134 MMHG | OXYGEN SATURATION: 98 % | WEIGHT: 241.4 LBS | BODY MASS INDEX: 34.56 KG/M2 | DIASTOLIC BLOOD PRESSURE: 86 MMHG

## 2022-12-05 DIAGNOSIS — Z00.00 INITIAL MEDICARE ANNUAL WELLNESS VISIT: Primary | ICD-10-CM

## 2022-12-05 DIAGNOSIS — I10 PRIMARY HYPERTENSION: ICD-10-CM

## 2022-12-05 PROCEDURE — 3017F COLORECTAL CA SCREEN DOC REV: CPT | Performed by: NURSE PRACTITIONER

## 2022-12-05 PROCEDURE — 3078F DIAST BP <80 MM HG: CPT | Performed by: NURSE PRACTITIONER

## 2022-12-05 PROCEDURE — G0438 PPPS, INITIAL VISIT: HCPCS | Performed by: NURSE PRACTITIONER

## 2022-12-05 PROCEDURE — G8484 FLU IMMUNIZE NO ADMIN: HCPCS | Performed by: NURSE PRACTITIONER

## 2022-12-05 PROCEDURE — 1123F ACP DISCUSS/DSCN MKR DOCD: CPT | Performed by: NURSE PRACTITIONER

## 2022-12-05 PROCEDURE — 3074F SYST BP LT 130 MM HG: CPT | Performed by: NURSE PRACTITIONER

## 2022-12-05 SDOH — ECONOMIC STABILITY: FOOD INSECURITY: WITHIN THE PAST 12 MONTHS, THE FOOD YOU BOUGHT JUST DIDN'T LAST AND YOU DIDN'T HAVE MONEY TO GET MORE.: NEVER TRUE

## 2022-12-05 SDOH — ECONOMIC STABILITY: FOOD INSECURITY: WITHIN THE PAST 12 MONTHS, YOU WORRIED THAT YOUR FOOD WOULD RUN OUT BEFORE YOU GOT MONEY TO BUY MORE.: NEVER TRUE

## 2022-12-05 ASSESSMENT — PATIENT HEALTH QUESTIONNAIRE - PHQ9
2. FEELING DOWN, DEPRESSED OR HOPELESS: 0
SUM OF ALL RESPONSES TO PHQ9 QUESTIONS 1 & 2: 0
SUM OF ALL RESPONSES TO PHQ QUESTIONS 1-9: 0
1. LITTLE INTEREST OR PLEASURE IN DOING THINGS: 0
SUM OF ALL RESPONSES TO PHQ QUESTIONS 1-9: 0

## 2022-12-05 ASSESSMENT — SOCIAL DETERMINANTS OF HEALTH (SDOH): HOW HARD IS IT FOR YOU TO PAY FOR THE VERY BASICS LIKE FOOD, HOUSING, MEDICAL CARE, AND HEATING?: NOT HARD AT ALL

## 2022-12-05 NOTE — PROGRESS NOTES
Medicare Annual Wellness Visit    Vicky Wilburn is here for Medicare AWV    Assessment & Plan   Initial Medicare annual wellness visit      Recommendations for Preventive Services Due: see orders and patient instructions/AVS.  Recommended screening schedule for the next 5-10 years is provided to the patient in written form: see Patient Instructions/AVS.     Return for Medicare Annual Wellness Visit in 1 year. Subjective   The following acute and/or chronic problems were also addressed today:  He has completed treatment with antibiotics for osteomyelitis. He has completed treatment with Dr. Savannah Gar. Blood pressure has been stable . Patient's complete Health Risk Assessment and screening values have been reviewed and are found in Flowsheets. The following problems were reviewed today and where indicated follow up appointments were made and/or referrals ordered.     Positive Risk Factor Screenings with Interventions:             General Health and ACP:  General  In general, how would you say your health is?: Good  In the past 7 days, have you experienced any of the following: New or Increased Pain, New or Increased Fatigue, Loneliness, Social Isolation, Stress or Anger?: No  Do you get the social and emotional support that you need?: Yes  Do you have a Living Will?: (!) No    Advance Directives       Power of  Living Will ACP-Advance Directive ACP-Power of     Not on File Not on File Not on File Not on File          General Health Risk Interventions:  No Living Will: Information given on Living Will    Health Habits/Nutrition:  Physical Activity: Inactive    Days of Exercise per Week: 0 days    Minutes of Exercise per Session: 0 min     Have you lost any weight without trying in the past 3 months?: No  Body mass index: (!) 34.63  Have you seen the dentist within the past year?: (!) No    Health Habits/Nutrition Interventions:  Nutritional issues:  exercises on a regular basis  Dental exam overdue:  patient declines dental evaluation    Hearing/Vision:  Do you or your family notice any trouble with your hearing that hasn't been managed with hearing aids?: (!) Yes  Do you have difficulty driving, watching TV, or doing any of your daily activities because of your eyesight?: No  Have you had an eye exam within the past year?: (!) No  No results found. Hearing/Vision Interventions:  Hearing concerns:  patient declines any further evaluation/treatment for hearing issues            Objective   Vitals:    12/05/22 1005   BP: 134/86   Pulse: 65   SpO2: 98%   Weight: 241 lb 6.4 oz (109.5 kg)   Height: 5' 10\" (1.778 m)      Body mass index is 34.64 kg/m². General Appearance: alert and oriented to person, place and time, well-developed and well-nourished, in no acute distress  Skin: warm and dry, no rash or erythema  Head: normocephalic and atraumatic  Eyes: pupils equal, round, and reactive to light, extraocular eye movements intact, conjunctivae normal  ENT: tympanic membrane, external ear and ear canal normal bilaterally, oropharynx clear and moist with normal mucous membranes and hearing abnormal- slightly abnormal  Neck: neck supple and non tender without mass, no thyromegaly or thyroid nodules, no cervical lymphadenopathy   Pulmonary/Chest: clear to auscultation bilaterally- no wheezes, rales or rhonchi, normal air movement, no respiratory distress  Cardiovascular: normal rate, normal S1 and S2, no gallops, and no carotid bruits  Abdomen: soft, non-tender, non-distended, normal bowel sounds, no masses or organomegaly  Extremities: no cyanosis, no clubbing, and no edema  Musculoskeletal: normal range of motion, no joint swelling, deformity or tenderness  Neurologic: no cranial nerve deficit, gait and coordination normal, and speech normal       No Known Allergies  Prior to Visit Medications    Medication Sig Taking?  Authorizing Provider   amLODIPine (NORVASC) 5 MG tablet TAKE 1 TABLET DAILY Yes SOLEDAD Lemus CNP   atenolol (TENORMIN) 25 MG tablet TAKE 1 TABLET TWICE A DAY Yes SOLEDAD Lemus CNP   levothyroxine (SYNTHROID) 175 MCG tablet Take 750 mcg by mouth Daily Yes Historical Provider, MD   acetaminophen (TYLENOL) 500 MG tablet Take 500 mg by mouth every 6 hours as needed for Pain Yes Historical Provider, MD Christy (Including outside providers/suppliers regularly involved in providing care):   Patient Care Team:  SOLEDAD Lemus CNP as PCP - [address] [place] [address], APRN - CNP as PCP - [facility] Empaneled Provider     Reviewed and updated this visit:  Tobacco  Allergies  Meds  Problems  Med Hx  Surg Hx  Soc Hx  Fam Hx                 Advance Care Planning   Advanced Care Planning: Discussed the patients choices for care and treatment in case of a health event that adversely affects decision-making abilities. Also discussed the patients long-term treatment options. Reviewed with the patient the appropriate state-specific advance directive documents. Reviewed the process of designating a competent adult as an Agent (or -in-fact) that could take make health care decisions for the patient if incompetent. Patient was asked to complete the declaration forms, either acknowledge the forms by a public notary or an eligible witness and provide a signed copy to the practice office. Time spent (minutes): 5       Cardiovascular Disease Risk Counseling: Assessed the patient's risk to develop cardiovascular disease and reviewed main risk factors.    Reviewed steps to reduce disease risk including:   Quitting tobacco use, reducing amount smoked, or not starting the habit  Making healthy food choices  Being physically active and gradually increasing activity levels   Reduce weight and determine a healthy BMI goal  Monitor blood pressure and treat if higher than 140/90 mmHg  Maintain blood total cholesterol levels under 5 mmol/l or 190 mg/dl  Maintain LDL cholesterol levels under 3.0 mmol/l or 115 mg/dl   Control blood glucose levels  Consider taking aspirin (75 mg daily), once blood pressure is controlled   Provided a follow up plan.   Time spent (minutes): 5

## 2022-12-05 NOTE — PATIENT INSTRUCTIONS
end-of-life care change as their health changes. If you make big changes to your living will, complete a new form. If you move to another state, make sure that your living will is legal in the state where you now live. In most cases, doctors will respect your wishes even if you have a form from a different state. You might use a universal form that has been approved by many states. This kind of form can sometimes be filled out and stored online. Your digital copy will then be available wherever you have a connection to the internet. The doctors and nurses who need to treat you can find it right away. Your state may offer an online registry. This is another place where you can store your living will online. It's a good idea to get your living will notarized. This means using a person called a [employer] to watch two people sign, or witness, your living will. What should you know when you create a living will? Here are some questions to ask yourself as you make your living will. Do you know enough about life support methods that might be used? If not, talk to your doctor so you know what might be done if you can't breathe on your own, your heart stops, or you can't swallow. What things would you still want to be able to do after you receive life-support methods? Would you want to be able to walk? To speak? To eat on your own? To live without the help of machines? Do you want certain Hindu practices performed if you become very ill? If you have a choice, where do you want to be cared for? In your home? At a hospital or nursing home? If you have a choice at the end of your life, where would you prefer to die? At home? In a hospital or nursing home? Somewhere else? Would you prefer to be buried or cremated? Do you want your organs to be donated after you die? What should you do with your living will?   Make sure that your family members and your health care agent have copies of your living will (also called a declaration). Give your doctor a copy of your living will. Ask to have it kept as part of your medical record. If you have more than one doctor, make sure that each one has a copy. Put a copy of your living will where it can be easily found. For example, some people may put a copy on their refrigerator door. If you are using a digital copy, be sure your doctor, family members, and health care agent know how to find and access it. Where can you learn more? Go to https://firstSTREET for Boomers & Beyondpejuvencioeb.UMicIt. org and sign in to your [employer] account. Enter G444 in the ShapeUp box to learn more about \"Learning About Living [place]roy. \"     If you do not have an account, please click on the \"Sign Up Now\" link. Current as of: June 16, 2022               Content Version: 13.4  © 2006-2022 [employer]. Care instructions adapted under license by [facility]. If you have questions about a medical condition or this instruction, always ask your healthcare professional. [address] any warranty or liability for your use of this information. Eating Healthy Foods: Care Instructions  Your Care Instructions     Eating healthy foods can help lower your risk for disease. Healthy food gives you energy and keeps your heart strong, your brain active, your muscles working, and your bones strong. A healthy diet includes a variety of foods from the basic food groups: grains, vegetables, fruits, milk and milk products, and meat and beans. Some people may eat more of their favorite foods from only one food group and, as a result, miss getting the nutrients they need. So, it is important to pay attention not only to what you eat but also to what you are missing from your diet. You can eat a healthy, balanced diet by making a few small changes. Follow-up care is a key part of your treatment and safety.  Be sure to make and go to all appointments, and call your doctor if you are having problems. It's also a good idea to know your test results and keep a list of the medicines you take. How can you care for yourself at home? Look at what you eat  Keep a food diary for a week or two and record everything you eat or drink. Track the number of servings you eat from each food group. For a balanced diet every day, eat a variety of:  6 or more ounce-equivalents of grains, such as cereals, breads, crackers, rice, or pasta, every day. An ounce-equivalent is 1 slice of bread, 1 cup of ready-to-eat cereal, or ½ cup of cooked rice, cooked pasta, or cooked cereal.  2½ cups of vegetables, especially:  Dark-green vegetables such as broccoli and spinach. Orange vegetables such as carrots and sweet potatoes. Dry beans (such as brown and kidney beans) and peas (such as lentils). 2 cups of fresh, frozen, or canned fruit. A small apple or 1 banana or orange equals 1 cup.  3 cups of nonfat or low-fat milk, yogurt, or other milk products. 5½ ounces of meat and beans, such as chicken, fish, lean meat, beans, nuts, and seeds. One egg, 1 tablespoon of peanut butter, ½ ounce nuts or seeds, or ¼ cup of cooked beans equals 1 ounce of meat. Learn how to read food labels for serving sizes and ingredients. Fast-food and convenience-food meals often contain few or no fruits or vegetables. Make sure you eat some fruits and vegetables to make the meal more nutritious. Look at your food diary. For each food group, add up what you have eaten and then divide the total by the number of days. This will give you an idea of how much you are eating from each food group. See if you can find some ways to change your diet to make it more healthy. Start small  Do not try to make dramatic changes to your diet all at once. You might feel that you are missing out on your favorite foods and then be more likely to fail. Start slowly, and gradually change your habits.  Try some of the following:  Use whole wheat bread instead of white bread. Use nonfat or low-fat milk instead of whole milk. Eat brown rice instead of white rice, and eat whole wheat pasta instead of white-flour pasta. Try low-fat cheeses and low-fat yogurt. Add more fruits and vegetables to meals and have them for snacks. Add lettuce, tomato, cucumber, and onion to sandwiches. Add fruit to yogurt and cereal.  Enjoy food  You can still eat your favorite foods. You just may need to eat less of them. If your favorite foods are high in fat, salt, and sugar, limit how often you eat them, but do not cut them out entirely. Eat a wide variety of foods. Make healthy choices when eating out  The type of restaurant you choose can help you make healthy choices. Even fast-food chains are now offering more low-fat or healthier choices on the menu. Choose smaller portions, or take half of your meal home. When eating out, try:  A veggie pizza with a whole wheat crust or grilled chicken (instead of sausage or pepperoni). Pasta with roasted vegetables, grilled chicken, or marinara sauce instead of cream sauce. A vegetable wrap or grilled chicken wrap. Broiled or poached food instead of fried or breaded items. Make healthy choices easy  Buy packaged, prewashed, ready-to-eat fresh vegetables and fruits, such as baby carrots, salad mixes, and chopped or shredded broccoli and cauliflower. Buy packaged, presliced fruits, such as melon or pineapple. Choose 100% fruit or vegetable juice instead of soda. Limit juice intake to 4 to 6 oz (½ to ¾ cup) a day. Blend low-fat yogurt, fruit juice, and canned or frozen fruit to make a smoothie for breakfast or a snack. Where can you learn more? Go to https://Schedule SavvygabyTang [employer] Energy.StudyCloud. org and sign in to your [employer] account. Enter T468 in the Cambiatta box to learn more about \"Eating Healthy Foods: Care Instructions. \"     If you do not have an account, please click on the \"Sign Up Now\" link. Current as of:  May 9, 2022 Content Version: 13.4  © 9685-5937 Healthwise, ProprietÃ¡rioDireto. Care instructions adapted under license by [facility] ([facility]). If you have questions about a medical condition or this instruction, always ask your healthcare professional. [address] any warranty or liability for your use of this information. Personalized Preventive Plan for Fabio De León - 12/5/2022  Medicare offers a range of preventive health benefits. Some of the tests and screenings are paid in full while other may be subject to a deductible, co-insurance, and/or copay. Some of these benefits include a comprehensive review of your medical history including lifestyle, illnesses that may run in your family, and various assessments and screenings as appropriate. After reviewing your medical record and screening and assessments performed today your provider may have ordered immunizations, labs, imaging, and/or referrals for you. A list of these orders (if applicable) as well as your Preventive Care list are included within your After Visit Summary for your review. Other Preventive Recommendations:    A preventive eye exam performed by an eye specialist is recommended every 1-2 years to screen for glaucoma; cataracts, macular degeneration, and other eye disorders. A preventive dental visit is recommended every 6 months. Try to get at least 150 minutes of exercise per week or 10,000 steps per day on a pedometer . Order or download the FREE \"Exercise & Physical Activity: Your Everyday Guide\" from [employer]. Call 5-158.264.9128 or search [employer] online. You need 1966-0156 mg of calcium and 7762-0621 IU of vitamin D per day.  It is possible to meet your calcium requirement with diet alone, but a vitamin D supplement is usually necessary to meet this goal.  When exposed to the sun, use a sunscreen that protects against both UVA and UVB radiation with an SPF of 30 or greater. Reapply every 2 to 3 hours or after sweating, drying off with a towel, or swimming. Always wear a seat belt when traveling in a car. Always wear a helmet when riding a bicycle or motorcycle.

## 2023-01-20 DIAGNOSIS — I10 ESSENTIAL HYPERTENSION: ICD-10-CM

## 2023-01-20 RX ORDER — ATENOLOL 25 MG/1
TABLET ORAL
Qty: 180 TABLET | Refills: 1 | Status: SHIPPED | OUTPATIENT
Start: 2023-01-20

## 2023-01-20 RX ORDER — AMLODIPINE BESYLATE 5 MG/1
TABLET ORAL
Qty: 90 TABLET | Refills: 1 | Status: SHIPPED | OUTPATIENT
Start: 2023-01-20

## 2023-06-05 ENCOUNTER — OFFICE VISIT (OUTPATIENT)
Dept: PRIMARY CARE CLINIC | Age: 73
End: 2023-06-05
Payer: MEDICARE

## 2023-06-05 VITALS
HEART RATE: 60 BPM | DIASTOLIC BLOOD PRESSURE: 74 MMHG | SYSTOLIC BLOOD PRESSURE: 110 MMHG | WEIGHT: 240 LBS | BODY MASS INDEX: 34.44 KG/M2 | OXYGEN SATURATION: 97 %

## 2023-06-05 DIAGNOSIS — I10 ESSENTIAL HYPERTENSION: Primary | ICD-10-CM

## 2023-06-05 DIAGNOSIS — Z13.220 LIPID SCREENING: ICD-10-CM

## 2023-06-05 DIAGNOSIS — Z12.5 SCREENING PSA (PROSTATE SPECIFIC ANTIGEN): ICD-10-CM

## 2023-06-05 PROBLEM — N40.0 BENIGN PROSTATIC HYPERPLASIA: Status: ACTIVE | Noted: 2023-06-05

## 2023-06-05 PROBLEM — E78.5 HYPERLIPIDEMIA: Status: ACTIVE | Noted: 2023-06-05

## 2023-06-05 PROBLEM — N23 URETER COLIC: Status: RESOLVED | Noted: 2021-07-09 | Resolved: 2023-06-05

## 2023-06-05 PROBLEM — I45.9 CONDUCTION DISORDER OF THE HEART: Status: ACTIVE | Noted: 2023-06-05

## 2023-06-05 PROBLEM — E66.9 OBESE: Status: ACTIVE | Noted: 2023-06-05

## 2023-06-05 PROCEDURE — 3078F DIAST BP <80 MM HG: CPT | Performed by: NURSE PRACTITIONER

## 2023-06-05 PROCEDURE — G8427 DOCREV CUR MEDS BY ELIG CLIN: HCPCS | Performed by: NURSE PRACTITIONER

## 2023-06-05 PROCEDURE — 3017F COLORECTAL CA SCREEN DOC REV: CPT | Performed by: NURSE PRACTITIONER

## 2023-06-05 PROCEDURE — 1123F ACP DISCUSS/DSCN MKR DOCD: CPT | Performed by: NURSE PRACTITIONER

## 2023-06-05 PROCEDURE — 1036F TOBACCO NON-USER: CPT | Performed by: NURSE PRACTITIONER

## 2023-06-05 PROCEDURE — G8417 CALC BMI ABV UP PARAM F/U: HCPCS | Performed by: NURSE PRACTITIONER

## 2023-06-05 PROCEDURE — 99213 OFFICE O/P EST LOW 20 MIN: CPT | Performed by: NURSE PRACTITIONER

## 2023-06-05 PROCEDURE — 3074F SYST BP LT 130 MM HG: CPT | Performed by: NURSE PRACTITIONER

## 2023-06-05 RX ORDER — M-VIT,TX,IRON,MINS/CALC/FOLIC 27MG-0.4MG
1 TABLET ORAL DAILY
COMMUNITY

## 2023-06-05 SDOH — ECONOMIC STABILITY: FOOD INSECURITY: WITHIN THE PAST 12 MONTHS, YOU WORRIED THAT YOUR FOOD WOULD RUN OUT BEFORE YOU GOT MONEY TO BUY MORE.: NEVER TRUE

## 2023-06-05 SDOH — ECONOMIC STABILITY: HOUSING INSECURITY
IN THE LAST 12 MONTHS, WAS THERE A TIME WHEN YOU DID NOT HAVE A STEADY PLACE TO SLEEP OR SLEPT IN A SHELTER (INCLUDING NOW)?: NO

## 2023-06-05 SDOH — ECONOMIC STABILITY: INCOME INSECURITY: HOW HARD IS IT FOR YOU TO PAY FOR THE VERY BASICS LIKE FOOD, HOUSING, MEDICAL CARE, AND HEATING?: NOT HARD AT ALL

## 2023-06-05 SDOH — ECONOMIC STABILITY: FOOD INSECURITY: WITHIN THE PAST 12 MONTHS, THE FOOD YOU BOUGHT JUST DIDN'T LAST AND YOU DIDN'T HAVE MONEY TO GET MORE.: NEVER TRUE

## 2023-06-05 ASSESSMENT — ENCOUNTER SYMPTOMS
NAUSEA: 0
BACK PAIN: 1
SINUS PAIN: 0
SINUS PRESSURE: 0
DIARRHEA: 0
CONSTIPATION: 0
COUGH: 0
SHORTNESS OF BREATH: 0

## 2023-06-05 ASSESSMENT — PATIENT HEALTH QUESTIONNAIRE - PHQ9
SUM OF ALL RESPONSES TO PHQ QUESTIONS 1-9: 0
2. FEELING DOWN, DEPRESSED OR HOPELESS: 0
1. LITTLE INTEREST OR PLEASURE IN DOING THINGS: 0
SUM OF ALL RESPONSES TO PHQ QUESTIONS 1-9: 0
SUM OF ALL RESPONSES TO PHQ9 QUESTIONS 1 & 2: 0

## 2023-06-05 NOTE — PROGRESS NOTES
[address] [address] PRIMARY CARE  [address]  [facility] [address] [place] [address]  Dept: [phone]    Fatuma Gibson is a 67 y.o. male Established patient, who presents today for his medical conditions/complaints as noted below. No chief complaint on file. HPI:     HPI   Blood pressure has been stable  He states he does not typically monitor at home. No lightheadedness or dizziness or headaches. He gets typical aches and pains particularly when he over does work. He has bilateral knee pain. He has braces but has not worn the knee braces recently. He does not typically take anything for the pain.     Reviewed prior notes: None   Reviewed previous:        LDL Cholesterol (mg/dL)   Date Value   07/23/2018 122   10/15/2015 121       (goal LDL is <100)   AST (U/L)   Date Value   11/02/2021 15     ALT (U/L)   Date Value   11/02/2021 15     BUN (mg/dL)   Date Value   08/17/2022 18     BP Readings from Last 3 Encounters:   06/05/23 110/74   12/05/22 134/86   08/17/22 134/84          (goal 120/80)    Past Medical History:   Diagnosis Date    Bradycardia     Hypertension     Kidney stones     kidney stones x 2 episodes      Past Surgical History:   Procedure Laterality Date    CYSTOSCOPY  07/09/2021    with stent insertion    CYSTOSCOPY Left 7/15/2021    CYSTOSCOPY URETEROSCOPY HOLMIUM LASER WITH STENT CHANGE performed by Lexi Delgadillo MD at [address] Bilateral 2010    cataracts    IR PERC ASPIRATION INTERVERT [address]  12/3/2021    IR PERC ASPIRATION INTERVERT [address] 12/3/2021 [facility] SPECIAL PROCEDURES    KIDNEY STONE REMOVAL      VASECTOMY         Family History   Problem Relation Age of Onset    High Blood Pressure Mother     Cancer Father        Social History     Tobacco Use    Smoking status: Never    Smokeless tobacco: Never   Substance Use Topics    Alcohol use: No     Alcohol/week: 0.0 standard drinks     Comment: if any, social occasional      Current Outpatient

## 2023-07-09 DIAGNOSIS — I10 ESSENTIAL HYPERTENSION: ICD-10-CM

## 2023-07-10 RX ORDER — AMLODIPINE BESYLATE 5 MG/1
TABLET ORAL
Qty: 90 TABLET | Refills: 1 | Status: SHIPPED | OUTPATIENT
Start: 2023-07-10

## 2023-07-10 RX ORDER — ATENOLOL 25 MG/1
TABLET ORAL
Qty: 180 TABLET | Refills: 1 | Status: SHIPPED | OUTPATIENT
Start: 2023-07-10

## 2023-07-10 NOTE — TELEPHONE ENCOUNTER
LAST VISIT:   12/05/2022    Future Appointments   Date Time Provider [address]  [address]   12/11/2023 11:30 AM SOLEDAD Loco CNP [facility]

## 2023-11-13 ENCOUNTER — HOSPITAL ENCOUNTER (OUTPATIENT)
Age: 73
Discharge: HOME | End: 2023-11-13
Payer: MEDICARE

## 2023-11-13 DIAGNOSIS — Z12.5 SCREENING PSA (PROSTATE SPECIFIC ANTIGEN): ICD-10-CM

## 2023-11-13 DIAGNOSIS — I10 ESSENTIAL HYPERTENSION: ICD-10-CM

## 2023-11-13 DIAGNOSIS — Z13.220 LIPID SCREENING: ICD-10-CM

## 2023-11-13 LAB
ALBUMIN SERPL-MCNC: 4 G/DL (ref 3.5–5.2)
ALP SERPL-CCNC: 80 U/L (ref 40–129)
ALT SERPL-CCNC: 21 U/L (ref 5–41)
ANION GAP SERPL CALCULATED.3IONS-SCNC: 9 MMOL/L (ref 9–17)
AST SERPL-CCNC: 21 U/L
BASOPHILS # BLD: 0 K/UL (ref 0–0.2)
BASOPHILS NFR BLD: 1 % (ref 0–2)
BILIRUB SERPL-MCNC: 0.6 MG/DL (ref 0.3–1.2)
BUN SERPL-MCNC: 17 MG/DL (ref 8–23)
CALCIUM SERPL-MCNC: 8.9 MG/DL (ref 8.6–10.4)
CHLORIDE SERPL-SCNC: 107 MMOL/L (ref 98–107)
CHOLEST SERPL-MCNC: 179 MG/DL
CHOLESTEROL/HDL RATIO: 4.4
CO2 SERPL-SCNC: 25 MMOL/L (ref 20–31)
CREAT SERPL-MCNC: 0.9 MG/DL (ref 0.7–1.2)
EOSINOPHIL # BLD: 0.1 K/UL (ref 0–0.4)
EOSINOPHILS RELATIVE PERCENT: 2 % (ref 0–4)
ERYTHROCYTE [DISTWIDTH] IN BLOOD BY AUTOMATED COUNT: 13.7 % (ref 11.5–14.9)
GFR SERPL CREATININE-BSD FRML MDRD: >60 ML/MIN/1.73M2
GLUCOSE P FAST SERPL-MCNC: 115 MG/DL (ref 70–99)
HCT VFR BLD AUTO: 44.5 % (ref 41–53)
HDLC SERPL-MCNC: 41 MG/DL
HGB BLD-MCNC: 14.6 G/DL (ref 13.5–17.5)
LDLC SERPL CALC-MCNC: 111 MG/DL (ref 0–130)
LYMPHOCYTES NFR BLD: 1.3 K/UL (ref 1–4.8)
LYMPHOCYTES RELATIVE PERCENT: 24 % (ref 24–44)
MCH RBC QN AUTO: 31.5 PG (ref 26–34)
MCHC RBC AUTO-ENTMCNC: 32.9 G/DL (ref 31–37)
MCV RBC AUTO: 96 FL (ref 80–100)
MONOCYTES NFR BLD: 0.4 K/UL (ref 0.1–1.3)
MONOCYTES NFR BLD: 7 % (ref 1–7)
NEUTROPHILS NFR BLD: 66 % (ref 36–66)
NEUTS SEG NFR BLD: 3.8 K/UL (ref 1.3–9.1)
PLATELET # BLD AUTO: 175 K/UL (ref 150–450)
PMV BLD AUTO: 8.5 FL (ref 6–12)
POTASSIUM SERPL-SCNC: 4.4 MMOL/L (ref 3.7–5.3)
PROT SERPL-MCNC: 6.7 G/DL (ref 6.4–8.3)
PSA SERPL-MCNC: 2.4 NG/ML (ref 0–4)
RBC # BLD AUTO: 4.64 M/UL (ref 4.5–5.9)
SODIUM SERPL-SCNC: 141 MMOL/L (ref 135–144)
TRIGL SERPL-MCNC: 134 MG/DL
WBC OTHER # BLD: 5.6 K/UL (ref 3.5–11)

## 2023-11-13 PROCEDURE — 80053 COMPREHEN METABOLIC PANEL: CPT

## 2023-11-13 PROCEDURE — 85025 COMPLETE CBC W/AUTO DIFF WBC: CPT

## 2023-11-13 PROCEDURE — 80061 LIPID PANEL: CPT

## 2023-11-13 PROCEDURE — G0103 PSA SCREENING: HCPCS

## 2023-11-13 PROCEDURE — 36415 COLL VENOUS BLD VENIPUNCTURE: CPT

## 2023-11-14 ENCOUNTER — TELEPHONE (OUTPATIENT)
Dept: PRIMARY CARE CLINIC | Age: 73
End: 2023-11-14

## 2023-12-11 ENCOUNTER — OFFICE VISIT (OUTPATIENT)
Dept: PRIMARY CARE CLINIC | Age: 73
End: 2023-12-11
Payer: MEDICARE

## 2023-12-11 VITALS
HEART RATE: 56 BPM | DIASTOLIC BLOOD PRESSURE: 82 MMHG | OXYGEN SATURATION: 98 % | BODY MASS INDEX: 34.3 KG/M2 | HEIGHT: 70 IN | WEIGHT: 239.6 LBS | SYSTOLIC BLOOD PRESSURE: 134 MMHG

## 2023-12-11 DIAGNOSIS — R73.03 PREDIABETES: ICD-10-CM

## 2023-12-11 DIAGNOSIS — Z00.00 MEDICARE ANNUAL WELLNESS VISIT, SUBSEQUENT: Primary | ICD-10-CM

## 2023-12-11 DIAGNOSIS — I10 ESSENTIAL HYPERTENSION: ICD-10-CM

## 2023-12-11 DIAGNOSIS — R73.09 ELEVATED GLUCOSE: ICD-10-CM

## 2023-12-11 LAB — HBA1C MFR BLD: 5.9 %

## 2023-12-11 PROCEDURE — G8484 FLU IMMUNIZE NO ADMIN: HCPCS | Performed by: NURSE PRACTITIONER

## 2023-12-11 PROCEDURE — 1123F ACP DISCUSS/DSCN MKR DOCD: CPT | Performed by: NURSE PRACTITIONER

## 2023-12-11 PROCEDURE — G0439 PPPS, SUBSEQ VISIT: HCPCS | Performed by: NURSE PRACTITIONER

## 2023-12-11 PROCEDURE — 3017F COLORECTAL CA SCREEN DOC REV: CPT | Performed by: NURSE PRACTITIONER

## 2023-12-11 PROCEDURE — 83036 HEMOGLOBIN GLYCOSYLATED A1C: CPT | Performed by: NURSE PRACTITIONER

## 2023-12-11 PROCEDURE — 3075F SYST BP GE 130 - 139MM HG: CPT | Performed by: NURSE PRACTITIONER

## 2023-12-11 PROCEDURE — 3079F DIAST BP 80-89 MM HG: CPT | Performed by: NURSE PRACTITIONER

## 2023-12-11 ASSESSMENT — LIFESTYLE VARIABLES
HOW MANY STANDARD DRINKS CONTAINING ALCOHOL DO YOU HAVE ON A TYPICAL DAY: 1 OR 2
HOW OFTEN DO YOU HAVE A DRINK CONTAINING ALCOHOL: MONTHLY OR LESS

## 2023-12-11 ASSESSMENT — PATIENT HEALTH QUESTIONNAIRE - PHQ9
SUM OF ALL RESPONSES TO PHQ QUESTIONS 1-9: 0
1. LITTLE INTEREST OR PLEASURE IN DOING THINGS: 0
2. FEELING DOWN, DEPRESSED OR HOPELESS: 0
SUM OF ALL RESPONSES TO PHQ QUESTIONS 1-9: 0
SUM OF ALL RESPONSES TO PHQ9 QUESTIONS 1 & 2: 0
SUM OF ALL RESPONSES TO PHQ QUESTIONS 1-9: 0
SUM OF ALL RESPONSES TO PHQ QUESTIONS 1-9: 0

## 2023-12-11 NOTE — PATIENT INSTRUCTIONS
Learning About Vision Tests  What are vision tests? The four most common vision tests are visual acuity tests, refraction, visual field tests, and color vision tests. Visual acuity (sharpness) tests  These tests are used: To see if you need glasses or contact lenses. To monitor an eye problem. To check an eye injury. Visual acuity tests are done as part of routine exams. You may also have this test when you get your 's license or apply for some types of jobs. Visual field tests  These tests are used: To check for vision loss in any area of your range of vision. To screen for certain eye diseases. To look for nerve damage after a stroke, head injury, or other problem that could reduce blood flow to the brain. Refraction and color tests  A refraction test is done to find the right prescription for glasses and contact lenses. A color vision test is done to check for color blindness. Color vision is often tested as part of a routine exam. You may also have this test when you apply for a job where recognizing different colors is important, such as , electronics, or the [employer]. How are vision tests done? Visual acuity test   You cover one eye at a time. You read aloud from a wall chart across the room. You read aloud from a small card that you hold in your hand. Refraction   You look into a special device. The device puts lenses of different strengths in front of each eye to see how strong your glasses or contact lenses need to be. Visual field tests   Your doctor may have you look through special machines. Or your doctor may simply have you stare straight ahead while they move a finger into and out of your field of vision. Color vision test   You look at pieces of printed test patterns in various colors. You say what number or symbol you see. Your doctor may have you trace the number or symbol using a pointer. How do these tests feel?   There is very little chance of

## 2023-12-11 NOTE — PROGRESS NOTES
care):   Patient Care Team:  SOLEDAD Dumont CNP as PCP - General  SOLEDAD Dumont CNP as PCP - Empaneled Provider     Reviewed and updated this visit:  Tobacco  Allergies  Meds  Problems  Med Hx  Surg Hx  Soc Hx  Fam Hx

## 2024-01-08 DIAGNOSIS — I10 ESSENTIAL HYPERTENSION: ICD-10-CM

## 2024-01-08 RX ORDER — AMLODIPINE BESYLATE 5 MG/1
TABLET ORAL
Qty: 90 TABLET | Refills: 1 | Status: SHIPPED | OUTPATIENT
Start: 2024-01-08

## 2024-01-08 RX ORDER — ATENOLOL 25 MG/1
TABLET ORAL
Qty: 180 TABLET | Refills: 1 | Status: SHIPPED | OUTPATIENT
Start: 2024-01-08

## 2024-01-08 NOTE — TELEPHONE ENCOUNTER
Future Appointments   Date Time Provider Department Center   6/10/2024 11:30 AM Jaleel Blood, SOLEDAD - CNP [facility]

## 2024-02-13 DIAGNOSIS — I10 ESSENTIAL HYPERTENSION: ICD-10-CM

## 2024-02-13 RX ORDER — ATENOLOL 25 MG/1
25 TABLET ORAL 2 TIMES DAILY
Qty: 180 TABLET | Refills: 0 | Status: SHIPPED | OUTPATIENT
Start: 2024-02-13 | End: 2024-05-13

## 2024-02-13 RX ORDER — AMLODIPINE BESYLATE 5 MG/1
5 TABLET ORAL DAILY
Qty: 90 TABLET | Refills: 0 | Status: SHIPPED | OUTPATIENT
Start: 2024-02-13 | End: 2024-05-13

## 2024-02-13 NOTE — TELEPHONE ENCOUNTER
LAST VISIT:   9/23/2021     Future Appointments   Date Time Provider Department Center   6/10/2024 11:30 AM Jaleel Blood APRN - CNP [facility]       Pharmacy verified? Yes    [facility] - [address] -  [phone] - F [phone]

## 2024-02-23 ENCOUNTER — TELEPHONE (OUTPATIENT)
Dept: PRIMARY CARE CLINIC | Age: 74
End: 2024-02-23

## 2024-02-23 DIAGNOSIS — I10 ESSENTIAL HYPERTENSION: ICD-10-CM

## 2024-02-23 RX ORDER — AMLODIPINE BESYLATE 5 MG/1
5 TABLET ORAL DAILY
Qty: 90 TABLET | Refills: 3 | Status: SHIPPED | OUTPATIENT
Start: 2024-02-23

## 2024-02-23 RX ORDER — ATENOLOL 25 MG/1
25 TABLET ORAL 2 TIMES DAILY
Qty: 180 TABLET | Refills: 3 | Status: SHIPPED | OUTPATIENT
Start: 2024-02-23

## 2024-02-23 NOTE — TELEPHONE ENCOUNTER
You sent in pt's BP meds to [facility] with no refills. Pt asking if you meant not to give him and his wife (Christine) refills?

## 2024-02-23 NOTE — TELEPHONE ENCOUNTER
It is just the way they were requested on the prescription refill tab.  If he lets someone know what they need refills on, I can send them in.

## 2024-06-07 ASSESSMENT — PATIENT HEALTH QUESTIONNAIRE - PHQ9
SUM OF ALL RESPONSES TO PHQ9 QUESTIONS 1 & 2: 0
1. LITTLE INTEREST OR PLEASURE IN DOING THINGS: NOT AT ALL
SUM OF ALL RESPONSES TO PHQ QUESTIONS 1-9: 0
SUM OF ALL RESPONSES TO PHQ9 QUESTIONS 1 & 2: 0
2. FEELING DOWN, DEPRESSED OR HOPELESS: NOT AT ALL
1. LITTLE INTEREST OR PLEASURE IN DOING THINGS: NOT AT ALL
SUM OF ALL RESPONSES TO PHQ QUESTIONS 1-9: 0
SUM OF ALL RESPONSES TO PHQ QUESTIONS 1-9: 0
2. FEELING DOWN, DEPRESSED OR HOPELESS: NOT AT ALL
SUM OF ALL RESPONSES TO PHQ QUESTIONS 1-9: 0

## 2024-06-10 ENCOUNTER — OFFICE VISIT (OUTPATIENT)
Dept: PRIMARY CARE CLINIC | Age: 74
End: 2024-06-10
Payer: MEDICARE

## 2024-06-10 VITALS
BODY MASS INDEX: 34.47 KG/M2 | WEIGHT: 240.8 LBS | OXYGEN SATURATION: 97 % | SYSTOLIC BLOOD PRESSURE: 130 MMHG | HEIGHT: 70 IN | DIASTOLIC BLOOD PRESSURE: 72 MMHG | HEART RATE: 78 BPM

## 2024-06-10 DIAGNOSIS — I10 PRIMARY HYPERTENSION: Primary | ICD-10-CM

## 2024-06-10 PROCEDURE — 1123F ACP DISCUSS/DSCN MKR DOCD: CPT | Performed by: NURSE PRACTITIONER

## 2024-06-10 PROCEDURE — 99213 OFFICE O/P EST LOW 20 MIN: CPT | Performed by: NURSE PRACTITIONER

## 2024-06-10 PROCEDURE — G8417 CALC BMI ABV UP PARAM F/U: HCPCS | Performed by: NURSE PRACTITIONER

## 2024-06-10 PROCEDURE — 3017F COLORECTAL CA SCREEN DOC REV: CPT | Performed by: NURSE PRACTITIONER

## 2024-06-10 PROCEDURE — G8427 DOCREV CUR MEDS BY ELIG CLIN: HCPCS | Performed by: NURSE PRACTITIONER

## 2024-06-10 PROCEDURE — 1036F TOBACCO NON-USER: CPT | Performed by: NURSE PRACTITIONER

## 2024-06-10 PROCEDURE — 3075F SYST BP GE 130 - 139MM HG: CPT | Performed by: NURSE PRACTITIONER

## 2024-06-10 PROCEDURE — 3078F DIAST BP <80 MM HG: CPT | Performed by: NURSE PRACTITIONER

## 2024-06-10 SDOH — ECONOMIC STABILITY: INCOME INSECURITY: HOW HARD IS IT FOR YOU TO PAY FOR THE VERY BASICS LIKE FOOD, HOUSING, MEDICAL CARE, AND HEATING?: NOT VERY HARD

## 2024-06-10 SDOH — ECONOMIC STABILITY: FOOD INSECURITY: WITHIN THE PAST 12 MONTHS, THE FOOD YOU BOUGHT JUST DIDN'T LAST AND YOU DIDN'T HAVE MONEY TO GET MORE.: NEVER TRUE

## 2024-06-10 SDOH — ECONOMIC STABILITY: FOOD INSECURITY: WITHIN THE PAST 12 MONTHS, YOU WORRIED THAT YOUR FOOD WOULD RUN OUT BEFORE YOU GOT MONEY TO BUY MORE.: NEVER TRUE

## 2024-06-10 ASSESSMENT — ENCOUNTER SYMPTOMS
WHEEZING: 0
DIARRHEA: 0
EYE DISCHARGE: 0
RHINORRHEA: 0
VOMITING: 0
NAUSEA: 0
COUGH: 0
EYE REDNESS: 0
ABDOMINAL PAIN: 0
SINUS PRESSURE: 1
SHORTNESS OF BREATH: 0
BACK PAIN: 1
SORE THROAT: 0

## 2024-06-10 NOTE — PROGRESS NOTES
Genitourinary:  Negative for dysuria and frequency.   Musculoskeletal:  Positive for arthralgias and back pain. Negative for myalgias.   Skin:  Negative for rash and wound.   Neurological:  Negative for dizziness, light-headedness and headaches.   Psychiatric/Behavioral:  Negative for confusion, dysphoric mood and sleep disturbance. The patient is not nervous/anxious.        Objective:     /72   Pulse 78   Ht 1.778 m (5' 10\")   Wt 109.2 kg (240 lb 12.8 oz)   SpO2 97%   BMI 34.55 kg/m²   Physical Exam  Vitals and nursing note reviewed.   Constitutional:       General: He is not in acute distress.     Appearance: He is well-developed. He is not ill-appearing.   HENT:      Head: Normocephalic and atraumatic.      Right Ear: Tympanic membrane, ear canal and external ear normal. Decreased hearing noted.      Left Ear: Tympanic membrane, ear canal and external ear normal. Decreased hearing noted.   Eyes:      General: No scleral icterus.        Right eye: No discharge.         Left eye: No discharge.      Conjunctiva/sclera: Conjunctivae normal.   Neck:      Thyroid: No thyromegaly.      Trachea: No tracheal deviation.   Cardiovascular:      Rate and Rhythm: Normal rate and regular rhythm.      Heart sounds: Normal heart sounds.      Comments: No carotid bruit   Pulmonary:      Effort: Pulmonary effort is normal. No respiratory distress.      Breath sounds: Normal breath sounds. No wheezing.   Abdominal:      General: Bowel sounds are normal.      Tenderness: There is no abdominal tenderness.   Musculoskeletal:      Right lower leg: No edema.      Left lower leg: No edema.      Comments: Knee pain   Lymphadenopathy:      Cervical: No cervical adenopathy.   Skin:     General: Skin is warm.      Findings: No rash.   Neurological:      Mental Status: He is alert and oriented to person, place, and time.   Psychiatric:         Mood and Affect: Mood normal.         Behavior: Behavior normal.         Thought

## 2025-01-13 ENCOUNTER — OFFICE VISIT (OUTPATIENT)
Dept: PRIMARY CARE CLINIC | Age: 75
End: 2025-01-13
Payer: MEDICARE

## 2025-01-13 VITALS
BODY MASS INDEX: 35.53 KG/M2 | HEIGHT: 70 IN | WEIGHT: 248.2 LBS | SYSTOLIC BLOOD PRESSURE: 132 MMHG | DIASTOLIC BLOOD PRESSURE: 76 MMHG | OXYGEN SATURATION: 96 % | HEART RATE: 69 BPM

## 2025-01-13 DIAGNOSIS — Z13.220 LIPID SCREENING: ICD-10-CM

## 2025-01-13 DIAGNOSIS — I10 ESSENTIAL HYPERTENSION: ICD-10-CM

## 2025-01-13 DIAGNOSIS — Z00.00 MEDICARE ANNUAL WELLNESS VISIT, SUBSEQUENT: Primary | ICD-10-CM

## 2025-01-13 DIAGNOSIS — Z12.5 SCREENING PSA (PROSTATE SPECIFIC ANTIGEN): ICD-10-CM

## 2025-01-13 PROCEDURE — 1159F MED LIST DOCD IN RCRD: CPT | Performed by: NURSE PRACTITIONER

## 2025-01-13 PROCEDURE — 3017F COLORECTAL CA SCREEN DOC REV: CPT | Performed by: NURSE PRACTITIONER

## 2025-01-13 PROCEDURE — 1160F RVW MEDS BY RX/DR IN RCRD: CPT | Performed by: NURSE PRACTITIONER

## 2025-01-13 PROCEDURE — 3075F SYST BP GE 130 - 139MM HG: CPT | Performed by: NURSE PRACTITIONER

## 2025-01-13 PROCEDURE — 3078F DIAST BP <80 MM HG: CPT | Performed by: NURSE PRACTITIONER

## 2025-01-13 PROCEDURE — 1123F ACP DISCUSS/DSCN MKR DOCD: CPT | Performed by: NURSE PRACTITIONER

## 2025-01-13 PROCEDURE — G0439 PPPS, SUBSEQ VISIT: HCPCS | Performed by: NURSE PRACTITIONER

## 2025-01-13 RX ORDER — AMLODIPINE BESYLATE 5 MG/1
5 TABLET ORAL DAILY
Qty: 90 TABLET | Refills: 3 | Status: SHIPPED | OUTPATIENT
Start: 2025-01-13

## 2025-01-13 RX ORDER — ATENOLOL 25 MG/1
25 TABLET ORAL 2 TIMES DAILY
Qty: 180 TABLET | Refills: 3 | Status: SHIPPED | OUTPATIENT
Start: 2025-01-13

## 2025-01-13 SDOH — ECONOMIC STABILITY: FOOD INSECURITY: WITHIN THE PAST 12 MONTHS, THE FOOD YOU BOUGHT JUST DIDN'T LAST AND YOU DIDN'T HAVE MONEY TO GET MORE.: NEVER TRUE

## 2025-01-13 SDOH — ECONOMIC STABILITY: FOOD INSECURITY: WITHIN THE PAST 12 MONTHS, YOU WORRIED THAT YOUR FOOD WOULD RUN OUT BEFORE YOU GOT MONEY TO BUY MORE.: NEVER TRUE

## 2025-01-13 ASSESSMENT — PATIENT HEALTH QUESTIONNAIRE - PHQ9
SUM OF ALL RESPONSES TO PHQ QUESTIONS 1-9: 0
2. FEELING DOWN, DEPRESSED OR HOPELESS: NOT AT ALL
SUM OF ALL RESPONSES TO PHQ QUESTIONS 1-9: 0
SUM OF ALL RESPONSES TO PHQ9 QUESTIONS 1 & 2: 0
1. LITTLE INTEREST OR PLEASURE IN DOING THINGS: NOT AT ALL
SUM OF ALL RESPONSES TO PHQ QUESTIONS 1-9: 0
SUM OF ALL RESPONSES TO PHQ QUESTIONS 1-9: 0

## 2025-01-13 ASSESSMENT — LIFESTYLE VARIABLES
HOW OFTEN DO YOU HAVE A DRINK CONTAINING ALCOHOL: NEVER
HOW MANY STANDARD DRINKS CONTAINING ALCOHOL DO YOU HAVE ON A TYPICAL DAY: PATIENT DOES NOT DRINK

## 2025-01-13 NOTE — PROGRESS NOTES
well-developed and well-nourished, in no acute distress  Skin: warm and dry, no rash or erythema  Head: normocephalic and atraumatic  Eyes: pupils equal, round, and reactive to light, extraocular eye movements intact, conjunctivae normal  ENT: tympanic membrane, external ear and ear canal normal bilaterally, oropharynx clear and moist with normal mucous membranes, hearing grossly normal bilaterally, sinuses non-tender, and good dentition  Neck: neck supple and non tender without mass, no thyromegaly or thyroid nodules, no cervical lymphadenopathy   Pulmonary/Chest: clear to auscultation bilaterally- no wheezes, rales or rhonchi, normal air movement, no respiratory distress  Cardiovascular: normal rate, normal S1 and S2, no gallops, and no carotid bruits  Abdomen: soft, non-tender, non-distended, normal bowel sounds, no masses or organomegaly  Extremities: no cyanosis, no clubbing, and no edema  Musculoskeletal: normal range of motion, no joint swelling, deformity or tenderness  Neurologic: no cranial nerve deficit, gait and coordination normal, and speech normal          No Known Allergies  Prior to Visit Medications    Medication Sig Taking? Authorizing Provider   amLODIPine (NORVASC) 5 MG tablet Take 1 tablet by mouth daily Yes Jaleel Blood APRN - CNP   atenolol (TENORMIN) 25 MG tablet Take 1 tablet by mouth 2 times daily Yes Jaleel Blood APRN - CNP   Multiple Vitamins-Minerals (THERAPEUTIC MULTIVITAMIN-MINERALS) tablet Take 1 tablet by mouth daily Yes Provider, Historical, MD       CareTeam (Including outside providers/suppliers regularly involved in providing care):   Patient Care Team:  Jaleel Blood APRN - CNP as PCP - General  Jaleel Blood APRN - CNP as PCP - Empaneled Provider     Recommendations for Preventive Services Due: see orders and patient instructions/AVS.  Recommended screening schedule for the next 5-10 years is provided to the patient in written form: see Patient

## 2025-01-13 NOTE — PATIENT INSTRUCTIONS
Continue current medication    Encouraged to increase physical activity      Starting a Weight-Loss Plan: Care Instructions  Overview    It can be a challenge to lose weight. But your doctor can help you make a weight-loss plan that meets your needs.  You don't have to make a lot of big changes at once. A better idea might be to focus on small changes and stick with them. When those changes become habit, you can add a few more changes.  Some people find it helpful to take an exercise or nutrition class. If you have questions, ask your doctor about seeing a registered dietitian or an exercise specialist. You might also think about joining a weight-loss support group.  If you're not ready to make changes right now, try to pick a date in the future. Then make an appointment with your doctor to talk about when and how you'll get started with a plan.  Follow-up care is a key part of your treatment and safety. Be sure to make and go to all appointments, and call your doctor if you are having problems. It's also a good idea to know your test results and keep a list of the medicines you take.  How can you care for yourself as you start a weight-loss plan?  Set realistic goals. Many people expect to lose much more weight than is likely. A weight loss of 5% to 10% of your body weight may be enough to improve your health.  Get family and friends involved to provide support. Talk to them about why you are trying to lose weight, and ask them to help. They can help by participating in exercise and having meals with you, even if they may be eating something different.  Find what works best for you. If you do not have time or do not like to cook, a program that offers meal replacement bars or shakes may be better for you. Or if you like to prepare meals, finding a plan that includes daily menus and recipes may be best.  Ask your doctor about other health professionals who can help you achieve your weight-loss goals.  A dietitian

## 2025-03-10 ENCOUNTER — HOSPITAL ENCOUNTER (OUTPATIENT)
Age: 75
Discharge: HOME | End: 2025-03-10
Payer: MEDICARE

## 2025-03-10 ENCOUNTER — RESULTS FOLLOW-UP (OUTPATIENT)
Dept: PRIMARY CARE CLINIC | Age: 75
End: 2025-03-10

## 2025-03-10 DIAGNOSIS — Z12.5 SCREENING PSA (PROSTATE SPECIFIC ANTIGEN): ICD-10-CM

## 2025-03-10 DIAGNOSIS — I10 ESSENTIAL HYPERTENSION: ICD-10-CM

## 2025-03-10 DIAGNOSIS — Z13.220 LIPID SCREENING: ICD-10-CM

## 2025-03-10 LAB
ALBUMIN SERPL-MCNC: 4.1 G/DL (ref 3.5–5.2)
ALP SERPL-CCNC: 85 U/L (ref 40–129)
ALT SERPL-CCNC: 33 U/L (ref 10–50)
ANION GAP SERPL CALCULATED.3IONS-SCNC: 9 MMOL/L (ref 9–16)
AST SERPL-CCNC: 23 U/L (ref 10–50)
BASOPHILS # BLD: 0 K/UL (ref 0–0.2)
BASOPHILS NFR BLD: 1 % (ref 0–2)
BILIRUB SERPL-MCNC: 0.5 MG/DL (ref 0–1.2)
BUN SERPL-MCNC: 19 MG/DL (ref 8–23)
CALCIUM SERPL-MCNC: 9 MG/DL (ref 8.6–10.4)
CHLORIDE SERPL-SCNC: 107 MMOL/L (ref 98–107)
CHOLEST SERPL-MCNC: 175 MG/DL (ref 0–199)
CHOLESTEROL/HDL RATIO: 4.2
CO2 SERPL-SCNC: 26 MMOL/L (ref 20–31)
CREAT SERPL-MCNC: 1 MG/DL (ref 0.7–1.2)
EOSINOPHIL # BLD: 0.1 K/UL (ref 0–0.4)
EOSINOPHILS RELATIVE PERCENT: 2 % (ref 0–4)
ERYTHROCYTE [DISTWIDTH] IN BLOOD BY AUTOMATED COUNT: 13.4 % (ref 11.5–14.9)
GFR, ESTIMATED: 79 ML/MIN/1.73M2
GLUCOSE SERPL-MCNC: 120 MG/DL (ref 74–99)
HCT VFR BLD AUTO: 43.6 % (ref 41–53)
HDLC SERPL-MCNC: 42 MG/DL
HGB BLD-MCNC: 14.7 G/DL (ref 13.5–17.5)
LDLC SERPL CALC-MCNC: 107 MG/DL (ref 0–100)
LYMPHOCYTES NFR BLD: 1.4 K/UL (ref 1–4.8)
LYMPHOCYTES RELATIVE PERCENT: 28 % (ref 24–44)
MCH RBC QN AUTO: 31.4 PG (ref 26–34)
MCHC RBC AUTO-ENTMCNC: 33.8 G/DL (ref 31–37)
MCV RBC AUTO: 93 FL (ref 80–100)
MONOCYTES NFR BLD: 0.4 K/UL (ref 0.1–1.3)
MONOCYTES NFR BLD: 8 % (ref 1–7)
NEUTROPHILS NFR BLD: 61 % (ref 36–66)
NEUTS SEG NFR BLD: 3 K/UL (ref 1.3–9.1)
PLATELET # BLD AUTO: 147 K/UL (ref 150–450)
PMV BLD AUTO: 9 FL (ref 6–12)
POTASSIUM SERPL-SCNC: 4.1 MMOL/L (ref 3.7–5.3)
PROT SERPL-MCNC: 6.7 G/DL (ref 6.6–8.7)
PSA SERPL-MCNC: 3.42 NG/ML (ref 0–4)
RBC # BLD AUTO: 4.69 M/UL (ref 4.5–5.9)
SODIUM SERPL-SCNC: 142 MMOL/L (ref 136–145)
TRIGL SERPL-MCNC: 128 MG/DL (ref 0–149)
WBC OTHER # BLD: 4.9 K/UL (ref 3.5–11)

## 2025-03-10 PROCEDURE — 36415 COLL VENOUS BLD VENIPUNCTURE: CPT

## 2025-03-10 PROCEDURE — 80061 LIPID PANEL: CPT

## 2025-03-10 PROCEDURE — G0103 PSA SCREENING: HCPCS

## 2025-03-10 PROCEDURE — 85025 COMPLETE CBC W/AUTO DIFF WBC: CPT

## 2025-03-10 PROCEDURE — 80053 COMPREHEN METABOLIC PANEL: CPT

## 2025-07-14 ENCOUNTER — OFFICE VISIT (OUTPATIENT)
Dept: PRIMARY CARE CLINIC | Age: 75
End: 2025-07-14
Payer: MEDICARE

## 2025-07-14 VITALS
HEART RATE: 64 BPM | SYSTOLIC BLOOD PRESSURE: 132 MMHG | BODY MASS INDEX: 34.53 KG/M2 | OXYGEN SATURATION: 97 % | DIASTOLIC BLOOD PRESSURE: 84 MMHG | HEIGHT: 70 IN | WEIGHT: 241.2 LBS

## 2025-07-14 DIAGNOSIS — R73.03 PREDIABETES: Primary | ICD-10-CM

## 2025-07-14 DIAGNOSIS — I10 PRIMARY HYPERTENSION: ICD-10-CM

## 2025-07-14 LAB — HBA1C MFR BLD: 5.8 %

## 2025-07-14 PROCEDURE — 3079F DIAST BP 80-89 MM HG: CPT | Performed by: NURSE PRACTITIONER

## 2025-07-14 PROCEDURE — G8427 DOCREV CUR MEDS BY ELIG CLIN: HCPCS | Performed by: NURSE PRACTITIONER

## 2025-07-14 PROCEDURE — G8417 CALC BMI ABV UP PARAM F/U: HCPCS | Performed by: NURSE PRACTITIONER

## 2025-07-14 PROCEDURE — 3075F SYST BP GE 130 - 139MM HG: CPT | Performed by: NURSE PRACTITIONER

## 2025-07-14 PROCEDURE — 83036 HEMOGLOBIN GLYCOSYLATED A1C: CPT | Performed by: NURSE PRACTITIONER

## 2025-07-14 PROCEDURE — 1159F MED LIST DOCD IN RCRD: CPT | Performed by: NURSE PRACTITIONER

## 2025-07-14 PROCEDURE — 1160F RVW MEDS BY RX/DR IN RCRD: CPT | Performed by: NURSE PRACTITIONER

## 2025-07-14 PROCEDURE — 1123F ACP DISCUSS/DSCN MKR DOCD: CPT | Performed by: NURSE PRACTITIONER

## 2025-07-14 PROCEDURE — 1036F TOBACCO NON-USER: CPT | Performed by: NURSE PRACTITIONER

## 2025-07-14 PROCEDURE — 3017F COLORECTAL CA SCREEN DOC REV: CPT | Performed by: NURSE PRACTITIONER

## 2025-07-14 PROCEDURE — 99213 OFFICE O/P EST LOW 20 MIN: CPT | Performed by: NURSE PRACTITIONER

## 2025-07-14 ASSESSMENT — ENCOUNTER SYMPTOMS
WHEEZING: 0
EYE REDNESS: 0
COUGH: 0
VOMITING: 0
RHINORRHEA: 0
NAUSEA: 0
ABDOMINAL PAIN: 0
SHORTNESS OF BREATH: 0
DIARRHEA: 0
EYE DISCHARGE: 0
SORE THROAT: 0

## 2025-07-14 NOTE — PROGRESS NOTES
[facility] PHYSICIANS  [facility] PRIMARY CARE  [address] [facility] [address]  Dept: [phone]    Jamil Mueller is a 74 y.o. male Established patient, who presents today for his medical conditions/complaints as noted below.      Chief Complaint   Patient presents with    Hypertension    Prediabetes       HPI:     History of Present Illness  The patient presents for a Medicare wellness visit.    He reports feeling well overall. His blood pressure reading today is 132/84. He has experienced a weight loss of approximately 7 pounds since his last visit, which he attributes to working outdoors in the heat. He maintains a healthy diet, although he occasionally indulges in junk food. He does not experience anxiety or depression and reports satisfactory sleep. He does not experience lightheadedness, dizziness, ear problems, eye pain, gum bleeding, sore throat, neck tenderness, chest pain, heart palpitations, coughing, wheezing, shortness of breath, constipation, diarrhea, acid reflux, heartburn, abdominal tenderness, back pain, foot swelling or numbness, or issues with his toenails. He has no history of ingrown nails and does not find his calluses bothersome. He declines colon cancer screening, shingles vaccine, and pneumonia vaccine.    He has hearing loss but does not find it bothersome. He has difficulty hearing in crowded places.    He is on metformin for prediabetes. His hemoglobin A1c is currently 5.8, which is in the prediabetic range. He inquires about the effect of metformin on his A1c levels and is informed that it can vary but generally lowers A1c by half a point to a point.    His platelets were noted to be slightly low in recent blood work, dropping below 150, which is the lower limit of the normal range. He does not recall any previous issues with low platelets and does not take aspirin frequently.    He takes aspirin sparingly for headaches.    Social History:  Hobbies:

## (undated) DEVICE — GUIDEWIRE URO L150CM DIA0.035IN STIFF NIT HYDRPHLC STR TIP

## (undated) DEVICE — ADAPTER URO SCP UROLOK LL

## (undated) DEVICE — STRIP,CLOSURE,WOUND,MEDI-STRIP,1/2X4: Brand: MEDLINE

## (undated) DEVICE — SOLUTION IRRIG 3000ML 0.9% SOD CHL USP UROMATIC PLAS CONT

## (undated) DEVICE — Z DISCONTINUED BY MEDLINE USE 2711682 TRAY SKIN PREP DRY W/ PREM GLV

## (undated) DEVICE — Device

## (undated) DEVICE — GLOVE ORANGE PI 7 1/2   MSG9075

## (undated) DEVICE — 3M™ STERI-STRIP™ COMPOUND BENZOIN TINCTURE 40 BAGS/CARTON 4 CARTONS/CASE C1544: Brand: 3M™ STERI-STRIP™

## (undated) DEVICE — LASER SURG FIBER MASTERPULSE

## (undated) DEVICE — SOLUTION IRRIG 1000ML STRL H2O USP PLAS POUR BTL

## (undated) DEVICE — ST CHARLES CYSTO PACK: Brand: MEDLINE INDUSTRIES, INC.

## (undated) DEVICE — FIBER LASER HOLM 11046] FORTEC MEDICAL INC]